# Patient Record
Sex: MALE | Race: BLACK OR AFRICAN AMERICAN | NOT HISPANIC OR LATINO | Employment: FULL TIME | URBAN - METROPOLITAN AREA
[De-identification: names, ages, dates, MRNs, and addresses within clinical notes are randomized per-mention and may not be internally consistent; named-entity substitution may affect disease eponyms.]

---

## 2023-03-25 PROBLEM — S06.5XAA SUBDURAL HEMATOMA (MULTI): Status: ACTIVE | Noted: 2023-03-25

## 2023-03-25 PROBLEM — I42.8: Status: ACTIVE | Noted: 2023-03-25

## 2023-03-25 PROBLEM — S32.009A FRACTURE LUMBAR VERTEBRA-CLOSED (MULTI): Status: ACTIVE | Noted: 2023-03-25

## 2023-03-25 PROBLEM — E11.21 DIABETIC NEPHROPATHY (MULTI): Status: ACTIVE | Noted: 2023-03-25

## 2023-03-25 PROBLEM — E10.641: Status: ACTIVE | Noted: 2023-03-25

## 2023-03-25 PROBLEM — M79.606 LOWER EXTREMITY PAIN: Status: ACTIVE | Noted: 2023-03-25

## 2023-03-25 PROBLEM — I10 BENIGN ESSENTIAL HTN: Status: ACTIVE | Noted: 2023-03-25

## 2023-03-25 PROBLEM — Z86.2 HISTORY OF ANEMIA: Status: ACTIVE | Noted: 2023-03-25

## 2023-03-25 PROBLEM — E55.9 VITAMIN D DEFICIENCY: Status: ACTIVE | Noted: 2023-03-25

## 2023-03-25 PROBLEM — R63.4 WEIGHT LOSS, UNINTENTIONAL: Status: ACTIVE | Noted: 2023-03-25

## 2023-03-25 PROBLEM — G89.4 CHRONIC PAIN SYNDROME: Status: ACTIVE | Noted: 2023-03-25

## 2023-03-25 PROBLEM — M54.50 LOW BACK PAIN: Status: ACTIVE | Noted: 2023-03-25

## 2023-03-25 PROBLEM — R25.2 LEG CRAMP: Status: ACTIVE | Noted: 2023-03-25

## 2023-03-25 PROBLEM — R29.818 SUSPECTED SLEEP APNEA: Status: ACTIVE | Noted: 2023-03-25

## 2023-03-25 PROBLEM — H53.8 BLURRY VISION, RIGHT EYE: Status: ACTIVE | Noted: 2023-03-25

## 2023-03-25 PROBLEM — N18.9 CKD (CHRONIC KIDNEY DISEASE): Status: ACTIVE | Noted: 2023-03-25

## 2023-03-25 PROBLEM — E10.9 TYPE 1 DIABETES (MULTI): Status: ACTIVE | Noted: 2023-03-25

## 2023-03-25 PROBLEM — E78.5 HYPERLIPEMIA: Status: ACTIVE | Noted: 2023-03-25

## 2023-03-25 PROBLEM — G47.00 INSOMNIA: Status: ACTIVE | Noted: 2023-03-25

## 2023-03-25 PROBLEM — M47.816 LUMBAR SPONDYLOSIS: Status: ACTIVE | Noted: 2023-03-25

## 2023-03-25 PROBLEM — K59.00 CONSTIPATION: Status: ACTIVE | Noted: 2023-03-25

## 2023-03-25 PROBLEM — S32.030A COMPRESSION FRACTURE OF THIRD LUMBAR VERTEBRA (MULTI): Status: ACTIVE | Noted: 2023-03-25

## 2023-03-25 PROBLEM — E16.2 HYPOGLYCEMIA: Status: ACTIVE | Noted: 2023-03-25

## 2023-03-25 PROBLEM — J30.2 SEASONAL ALLERGIC RHINITIS: Status: ACTIVE | Noted: 2023-03-25

## 2023-03-25 PROBLEM — E10.65 UNCONTROLLED TYPE 1 DIABETES MELLITUS WITH HYPERGLYCEMIA (MULTI): Status: ACTIVE | Noted: 2023-03-25

## 2023-03-25 PROBLEM — Z96.41 INSULIN PUMP STATUS: Status: ACTIVE | Noted: 2023-03-25

## 2023-03-25 PROBLEM — R07.89 ATYPICAL CHEST PAIN: Status: ACTIVE | Noted: 2023-03-25

## 2023-03-25 PROBLEM — N52.9 MALE ERECTILE DISORDER: Status: ACTIVE | Noted: 2023-03-25

## 2023-03-25 RX ORDER — POLYETHYLENE GLYCOL 3350 17 G/17G
POWDER, FOR SOLUTION ORAL
COMMUNITY
Start: 2022-06-24

## 2023-03-25 RX ORDER — LISINOPRIL 40 MG/1
TABLET ORAL
COMMUNITY
Start: 2020-03-23 | End: 2024-01-04 | Stop reason: SDUPTHER

## 2023-03-25 RX ORDER — ERGOCALCIFEROL 1.25 MG/1
1 CAPSULE ORAL
COMMUNITY
Start: 2022-01-19 | End: 2024-01-18 | Stop reason: SDUPTHER

## 2023-03-25 RX ORDER — CHLORTHALIDONE 50 MG/1
1 TABLET ORAL DAILY
COMMUNITY
Start: 2020-01-14 | End: 2023-06-05 | Stop reason: SDUPTHER

## 2023-03-25 RX ORDER — INSULIN LISPRO 100 [IU]/ML
INJECTION, SOLUTION INTRAVENOUS; SUBCUTANEOUS
COMMUNITY
End: 2024-02-26 | Stop reason: SDUPTHER

## 2023-03-25 RX ORDER — PEN NEEDLE, DIABETIC 30 GX3/16"
NEEDLE, DISPOSABLE MISCELLANEOUS
COMMUNITY

## 2023-03-25 RX ORDER — OXYCODONE AND ACETAMINOPHEN 5; 325 MG/1; MG/1
TABLET ORAL
COMMUNITY
Start: 2022-11-27 | End: 2023-03-28 | Stop reason: SDUPTHER

## 2023-03-25 RX ORDER — BLOOD SUGAR DIAGNOSTIC
STRIP MISCELLANEOUS
COMMUNITY
Start: 2021-05-04 | End: 2024-02-08 | Stop reason: SDUPTHER

## 2023-03-25 RX ORDER — FLUTICASONE PROPIONATE 50 MCG
1 SPRAY, SUSPENSION (ML) NASAL 2 TIMES DAILY
COMMUNITY
Start: 2022-06-24

## 2023-03-25 RX ORDER — HYDRALAZINE HYDROCHLORIDE 25 MG/1
1 TABLET, FILM COATED ORAL 3 TIMES DAILY
COMMUNITY
Start: 2020-09-08 | End: 2023-06-05 | Stop reason: SDUPTHER

## 2023-03-25 RX ORDER — LIDOCAINE 50 MG/G
PATCH TOPICAL
COMMUNITY
Start: 2021-02-11 | End: 2024-03-07 | Stop reason: SDUPTHER

## 2023-03-25 RX ORDER — SILDENAFIL 25 MG/1
TABLET, FILM COATED ORAL
COMMUNITY
Start: 2020-09-29

## 2023-03-25 RX ORDER — HYDROXYZINE HYDROCHLORIDE 25 MG/1
25 TABLET, FILM COATED ORAL EVERY 4 HOURS PRN
COMMUNITY
Start: 2022-06-24

## 2023-03-25 RX ORDER — ACETAMINOPHEN 500 MG
TABLET ORAL
COMMUNITY
Start: 2020-07-18

## 2023-03-25 RX ORDER — AMLODIPINE BESYLATE 10 MG/1
1 TABLET ORAL DAILY
COMMUNITY
Start: 2018-12-22 | End: 2023-06-05 | Stop reason: SDUPTHER

## 2023-03-25 RX ORDER — CETIRIZINE HYDROCHLORIDE 10 MG/1
TABLET ORAL
COMMUNITY
Start: 2022-06-24 | End: 2023-06-29 | Stop reason: SDUPTHER

## 2023-03-25 RX ORDER — FERROUS GLUCONATE 324(38)MG
1 TABLET ORAL EVERY OTHER DAY
COMMUNITY
Start: 2022-06-24 | End: 2024-01-18 | Stop reason: ALTCHOICE

## 2023-03-25 RX ORDER — NALOXONE HYDROCHLORIDE 4 MG/.1ML
SPRAY NASAL
COMMUNITY
Start: 2020-12-03 | End: 2024-03-08 | Stop reason: SDUPTHER

## 2023-03-25 RX ORDER — ATORVASTATIN CALCIUM 40 MG/1
1 TABLET, FILM COATED ORAL NIGHTLY
COMMUNITY
Start: 2020-09-29 | End: 2023-06-05 | Stop reason: SDUPTHER

## 2023-03-28 ENCOUNTER — OFFICE VISIT (OUTPATIENT)
Dept: PRIMARY CARE | Facility: CLINIC | Age: 43
End: 2023-03-28
Payer: COMMERCIAL

## 2023-03-28 VITALS
BODY MASS INDEX: 27.82 KG/M2 | DIASTOLIC BLOOD PRESSURE: 83 MMHG | WEIGHT: 193.9 LBS | TEMPERATURE: 99.1 F | OXYGEN SATURATION: 97 % | SYSTOLIC BLOOD PRESSURE: 141 MMHG | HEART RATE: 70 BPM

## 2023-03-28 DIAGNOSIS — S32.030S COMPRESSION FRACTURE OF L3 VERTEBRA, SEQUELA: Primary | ICD-10-CM

## 2023-03-28 DIAGNOSIS — S32.009S CLOSED FRACTURE OF LUMBAR VERTEBRA, UNSPECIFIED FRACTURE MORPHOLOGY, UNSPECIFIED LUMBAR VERTEBRAL LEVEL, SEQUELA: ICD-10-CM

## 2023-03-28 PROCEDURE — 4010F ACE/ARB THERAPY RXD/TAKEN: CPT | Performed by: STUDENT IN AN ORGANIZED HEALTH CARE EDUCATION/TRAINING PROGRAM

## 2023-03-28 PROCEDURE — 99214 OFFICE O/P EST MOD 30 MIN: CPT | Performed by: STUDENT IN AN ORGANIZED HEALTH CARE EDUCATION/TRAINING PROGRAM

## 2023-03-28 PROCEDURE — 1036F TOBACCO NON-USER: CPT | Performed by: STUDENT IN AN ORGANIZED HEALTH CARE EDUCATION/TRAINING PROGRAM

## 2023-03-28 PROCEDURE — 3077F SYST BP >= 140 MM HG: CPT | Performed by: STUDENT IN AN ORGANIZED HEALTH CARE EDUCATION/TRAINING PROGRAM

## 2023-03-28 PROCEDURE — 3079F DIAST BP 80-89 MM HG: CPT | Performed by: STUDENT IN AN ORGANIZED HEALTH CARE EDUCATION/TRAINING PROGRAM

## 2023-03-28 RX ORDER — OXYCODONE AND ACETAMINOPHEN 5; 325 MG/1; MG/1
1 TABLET ORAL 2 TIMES DAILY PRN
Qty: 60 TABLET | Refills: 0 | Status: SHIPPED | OUTPATIENT
Start: 2023-03-28 | End: 2023-04-27 | Stop reason: SDUPTHER
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
## 2023-03-29 ENCOUNTER — APPOINTMENT (OUTPATIENT)
Dept: PRIMARY CARE | Facility: CLINIC | Age: 43
End: 2023-03-29

## 2023-04-09 NOTE — PROGRESS NOTES
I saw and evaluated the patient. I personally obtained the key and critical portions of the history and physical exam or was physically present for key and critical portions performed by the resident/fellow. I reviewed the resident/fellow's documentation and discussed the patient with the resident/fellow. I agree with the resident/fellow's medical decision making as documented in the note.    Ingrid Gonzalez MD

## 2023-04-27 DIAGNOSIS — S32.030S COMPRESSION FRACTURE OF L3 VERTEBRA, SEQUELA: ICD-10-CM

## 2023-04-27 RX ORDER — OXYCODONE AND ACETAMINOPHEN 5; 325 MG/1; MG/1
1 TABLET ORAL 2 TIMES DAILY PRN
Qty: 60 TABLET | Refills: 0 | Status: SHIPPED | OUTPATIENT
Start: 2023-04-27 | End: 2023-06-29 | Stop reason: SDUPTHER

## 2023-05-31 ENCOUNTER — TELEPHONE (OUTPATIENT)
Dept: PRIMARY CARE | Facility: CLINIC | Age: 43
End: 2023-05-31

## 2023-05-31 NOTE — TELEPHONE ENCOUNTER
Patient called and requested for his medication to be refilled, I called to reach out to the patient about what medication needs to be refilled but no answer.

## 2023-06-05 DIAGNOSIS — E78.2 MODERATE MIXED HYPERLIPIDEMIA NOT REQUIRING STATIN THERAPY: ICD-10-CM

## 2023-06-05 DIAGNOSIS — I10 BENIGN ESSENTIAL HTN: Primary | ICD-10-CM

## 2023-06-05 RX ORDER — AMLODIPINE BESYLATE 10 MG/1
10 TABLET ORAL DAILY
Qty: 90 TABLET | Refills: 3 | Status: SHIPPED | OUTPATIENT
Start: 2023-06-05 | End: 2024-03-07 | Stop reason: SDUPTHER

## 2023-06-05 RX ORDER — CHLORTHALIDONE 50 MG/1
50 TABLET ORAL DAILY
Qty: 90 TABLET | Refills: 3 | Status: SHIPPED | OUTPATIENT
Start: 2023-06-05 | End: 2024-03-07 | Stop reason: SDUPTHER

## 2023-06-05 RX ORDER — HYDRALAZINE HYDROCHLORIDE 25 MG/1
25 TABLET, FILM COATED ORAL 3 TIMES DAILY
Qty: 90 TABLET | Refills: 3 | Status: SHIPPED | OUTPATIENT
Start: 2023-06-05 | End: 2024-03-07 | Stop reason: SDUPTHER

## 2023-06-05 RX ORDER — ATORVASTATIN CALCIUM 40 MG/1
40 TABLET, FILM COATED ORAL NIGHTLY
Qty: 90 TABLET | Refills: 3 | Status: SHIPPED | OUTPATIENT
Start: 2023-06-05 | End: 2024-03-07 | Stop reason: SDUPTHER

## 2023-06-08 ENCOUNTER — TELEPHONE (OUTPATIENT)
Dept: PRIMARY CARE | Facility: CLINIC | Age: 43
End: 2023-06-08

## 2023-06-09 ENCOUNTER — TELEPHONE (OUTPATIENT)
Dept: PRIMARY CARE | Facility: CLINIC | Age: 43
End: 2023-06-09

## 2023-06-14 ENCOUNTER — TELEPHONE (OUTPATIENT)
Dept: PRIMARY CARE | Facility: CLINIC | Age: 43
End: 2023-06-14

## 2023-06-14 NOTE — TELEPHONE ENCOUNTER
PT came in asking for a refill of his medication for opioids.  Had an appointment today but was late and has to reschedule, was wondering if he could still get his pain med's refilled.

## 2023-06-29 ENCOUNTER — OFFICE VISIT (OUTPATIENT)
Dept: PRIMARY CARE | Facility: CLINIC | Age: 43
End: 2023-06-29
Payer: COMMERCIAL

## 2023-06-29 VITALS
DIASTOLIC BLOOD PRESSURE: 80 MMHG | OXYGEN SATURATION: 100 % | BODY MASS INDEX: 26.36 KG/M2 | HEIGHT: 70 IN | SYSTOLIC BLOOD PRESSURE: 128 MMHG | RESPIRATION RATE: 18 BRPM | WEIGHT: 184.1 LBS | TEMPERATURE: 96.1 F | HEART RATE: 73 BPM

## 2023-06-29 DIAGNOSIS — T78.40XD ALLERGY, SUBSEQUENT ENCOUNTER: Primary | ICD-10-CM

## 2023-06-29 DIAGNOSIS — Z79.899 CONTROLLED SUBSTANCE AGREEMENT SIGNED: ICD-10-CM

## 2023-06-29 DIAGNOSIS — S32.030S COMPRESSION FRACTURE OF L3 VERTEBRA, SEQUELA: ICD-10-CM

## 2023-06-29 DIAGNOSIS — G89.21 CHRONIC PAIN AFTER TRAUMATIC INJURY: ICD-10-CM

## 2023-06-29 PROCEDURE — 99214 OFFICE O/P EST MOD 30 MIN: CPT

## 2023-06-29 PROCEDURE — 3079F DIAST BP 80-89 MM HG: CPT

## 2023-06-29 PROCEDURE — 3074F SYST BP LT 130 MM HG: CPT

## 2023-06-29 PROCEDURE — 4010F ACE/ARB THERAPY RXD/TAKEN: CPT

## 2023-06-29 PROCEDURE — 1036F TOBACCO NON-USER: CPT

## 2023-06-29 RX ORDER — OXYCODONE AND ACETAMINOPHEN 5; 325 MG/1; MG/1
1 TABLET ORAL 2 TIMES DAILY PRN
Qty: 60 TABLET | Refills: 0 | Status: SHIPPED | OUTPATIENT
Start: 2023-06-29 | End: 2023-08-22 | Stop reason: SDUPTHER

## 2023-06-29 RX ORDER — OXYCODONE AND ACETAMINOPHEN 5; 325 MG/1; MG/1
1 TABLET ORAL 2 TIMES DAILY PRN
Qty: 60 TABLET | Refills: 0 | Status: SHIPPED | OUTPATIENT
Start: 2023-06-29 | End: 2023-06-29 | Stop reason: SDUPTHER

## 2023-06-29 RX ORDER — CETIRIZINE HYDROCHLORIDE 10 MG/1
10 TABLET ORAL DAILY
Qty: 90 TABLET | Refills: 3 | Status: SHIPPED | OUTPATIENT
Start: 2023-06-29

## 2023-06-29 ASSESSMENT — PAIN SCALES - GENERAL: PAINLEVEL: 8
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
## 2023-06-29 NOTE — PROGRESS NOTES
Subjective:  The patient presents to the clinic today for medication refill. He reported that he missed his last appointment and has run out of his Percocet 2-3 weeks ago. He reported that he works as an  at [employer] which is a very physical job. He reported that he is able to complete his ADLs and tasks at his job due to the medication making his pain more manageable. He reported he keeps his medications in a lock box at home so only he has access to them.     He also requested a refill on Zyrtec.    OARRS:  Cosme Hightower MD on 6/29/2023  5:47 PM  I have personally reviewed the OARRS report for Good Gee. I have considered the risks of abuse, dependence, addiction and diversion    Is the patient prescribed a combination of a benzodiazepine and opioid?  No    Last Urine Drug Screen / ordered today: Yes  Recent Results (from the past 79285 hour(s))   Opiate Confirmation, Urine    Collection Time: 06/24/22  5:50 PM   Result Value Ref Range    6-Acetylmorphine <25 Cutoff <25 ng/mL    Codeine <50 Cutoff <50 ng/mL    Hydrocodone <25 Cutoff <25 ng/mL    Hydromorphone <25 Cutoff <25 ng/mL    Morphine Urine <50 Cutoff <50 ng/mL    Norhydrocodone <25 Cutoff <25 ng/mL    Noroxycodone 447 (A) Cutoff <25 ng/mL    Oxycodone 77 (A) Cutoff <25 ng/mL    Oxymorphone 202 (A) Cutoff <25 ng/mL   Drug Screen, Urine With Reflex to Confirmation    Collection Time: 06/24/22  5:50 PM   Result Value Ref Range    DRUG SCREEN COMMENT URINE SEE BELOW     Amphetamine Screen, Urine PRESUMPTIVE NEGATIVE NEGATIVE    Barbiturate Screen, Urine PRESUMPTIVE NEGATIVE NEGATIVE    BENZODIAZEPINE (PRESENCE) IN URINE BY SCREEN METHOD PRESUMPTIVE NEGATIVE NEGATIVE    Cannabinoid Screen, Urine PRESUMPTIVE NEGATIVE NEGATIVE    Cocaine Screen, Urine PRESUMPTIVE NEGATIVE NEGATIVE    Fentanyl, Ur PRESUMPTIVE NEGATIVE NEGATIVE    Methadone Screen, Urine PRESUMPTIVE NEGATIVE NEGATIVE    Opiate Screen, Urine PRESUMPTIVE NEGATIVE NEGATIVE     "Oxycodone Screen, Ur PRESUMPTIVE POSITIVE (A) NEGATIVE    PCP Screen, Urine PRESUMPTIVE NEGATIVE NEGATIVE     N/A    Controlled Substance Agreement:  Date of the Last Agreement: 3/28/2023  Reviewed Controlled Substance Agreement including but not limited to the benefits, risks, and alternatives to treatment with a Controlled Substance medication(s).    Opioids:  What is the patient's goal of therapy? To reduce pain to make it more tolerable and be able to complete his ADLs and job tasks  Is this being achieved with current treatment? Yes, he reported his pain is 10-11/10 but is more manageable 4/10 on Percocet    Side Effects:  The patient denies any drowsiness, constipation or any other side effects    I feel that it is clinically indicated to continue this current medication regimen after consideration of alternative therapies, and other non-opioid treatment.    Opioid Risk Screening:  No data recorded    Pain Assessment:  No data recorded      Review of Systems   Constitutional:  Negative for fever.   Respiratory:  Negative for chest tightness and shortness of breath.    Cardiovascular:  Negative for chest pain.   Gastrointestinal:  Negative for abdominal distention, constipation, nausea and vomiting.   Psychiatric/Behavioral:  Negative for agitation and behavioral problems.        Objective   /80 (BP Location: Right arm, Patient Position: Sitting, BP Cuff Size: Adult)   Pulse 73   Temp 35.6 °C (96.1 °F) (Temporal)   Resp 18   Ht 1.778 m (5' 10\")   Wt 83.5 kg (184 lb 1.6 oz)   SpO2 100%   BMI 26.42 kg/m²    Physical Exam  Constitutional:       General: He is not in acute distress.     Appearance: Normal appearance.   Eyes:      Extraocular Movements: Extraocular movements intact.   Cardiovascular:      Rate and Rhythm: Normal rate and regular rhythm.      Heart sounds: Normal heart sounds. No murmur heard.  Pulmonary:      Effort: Pulmonary effort is normal. No respiratory distress.      Breath " sounds: Normal breath sounds.   Abdominal:      General: There is no distension.      Palpations: Abdomen is soft.      Tenderness: There is no abdominal tenderness.   Musculoskeletal:         General: Tenderness present. Normal range of motion.      Comments: Tenderness to palpation of lumbar spine   Neurological:      General: No focal deficit present.      Mental Status: He is alert.   Psychiatric:         Mood and Affect: Mood normal.         Behavior: Behavior normal.       Assessment/Plan     Good Gee is a 43 year old male who presents to the clinic for medication refills.    #Chronic lower back pain  - OARRS reviewed  - Refilled Percocet for 1 month supply  - Last UDS 1 year ago, ordered new UDS to be done at next visit as patient has not taken his meds in ~3 weeks    #Med refill  - Refilled Zyrtec    Problem List Items Addressed This Visit          Neuro    Compression fracture of third lumbar vertebra (CMS/HCC)    Relevant Medications    oxyCODONE-acetaminophen (Percocet) 5-325 mg tablet     Other Visit Diagnoses       Allergy, subsequent encounter    -  Primary    Relevant Medications    cetirizine (ZyrTEC) 10 mg tablet    Controlled substance agreement signed        Relevant Orders    Opiate/Opioid/Benzo Extended Prescription Compliance              RTC in 1 month for follow up visit and UDS.    The patient was discussed with Dr. Gonzalez.    Cosme Hightower MD  Family Medicine   PGY-2

## 2023-06-30 ASSESSMENT — ENCOUNTER SYMPTOMS
NAUSEA: 0
VOMITING: 0
CONSTIPATION: 0
FEVER: 0
AGITATION: 0
ABDOMINAL DISTENTION: 0
CHEST TIGHTNESS: 0
SHORTNESS OF BREATH: 0

## 2023-08-22 ENCOUNTER — APPOINTMENT (OUTPATIENT)
Dept: PRIMARY CARE | Facility: CLINIC | Age: 43
End: 2023-08-22

## 2023-08-22 ENCOUNTER — OFFICE VISIT (OUTPATIENT)
Dept: PRIMARY CARE | Facility: CLINIC | Age: 43
End: 2023-08-22

## 2023-08-22 VITALS
SYSTOLIC BLOOD PRESSURE: 139 MMHG | DIASTOLIC BLOOD PRESSURE: 88 MMHG | BODY MASS INDEX: 25.45 KG/M2 | RESPIRATION RATE: 18 BRPM | TEMPERATURE: 96.3 F | HEART RATE: 64 BPM | OXYGEN SATURATION: 99 % | HEIGHT: 71 IN | WEIGHT: 181.8 LBS

## 2023-08-22 DIAGNOSIS — Z79.899 CONTROLLED SUBSTANCE AGREEMENT SIGNED: ICD-10-CM

## 2023-08-22 DIAGNOSIS — S32.030S COMPRESSION FRACTURE OF L3 VERTEBRA, SEQUELA: ICD-10-CM

## 2023-08-22 DIAGNOSIS — G89.4 CHRONIC PAIN DISORDER: Primary | ICD-10-CM

## 2023-08-22 PROCEDURE — 4010F ACE/ARB THERAPY RXD/TAKEN: CPT | Performed by: STUDENT IN AN ORGANIZED HEALTH CARE EDUCATION/TRAINING PROGRAM

## 2023-08-22 PROCEDURE — 99214 OFFICE O/P EST MOD 30 MIN: CPT | Performed by: STUDENT IN AN ORGANIZED HEALTH CARE EDUCATION/TRAINING PROGRAM

## 2023-08-22 PROCEDURE — 1036F TOBACCO NON-USER: CPT | Performed by: STUDENT IN AN ORGANIZED HEALTH CARE EDUCATION/TRAINING PROGRAM

## 2023-08-22 PROCEDURE — 3079F DIAST BP 80-89 MM HG: CPT | Performed by: STUDENT IN AN ORGANIZED HEALTH CARE EDUCATION/TRAINING PROGRAM

## 2023-08-22 PROCEDURE — 3075F SYST BP GE 130 - 139MM HG: CPT | Performed by: STUDENT IN AN ORGANIZED HEALTH CARE EDUCATION/TRAINING PROGRAM

## 2023-08-22 RX ORDER — OXYCODONE AND ACETAMINOPHEN 5; 325 MG/1; MG/1
1 TABLET ORAL 2 TIMES DAILY PRN
Qty: 50 TABLET | Refills: 0 | Status: SHIPPED | OUTPATIENT
Start: 2023-08-22 | End: 2023-08-22

## 2023-08-22 ASSESSMENT — PAIN SCALES - GENERAL: PAINLEVEL: 8
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
## 2023-08-23 NOTE — PROGRESS NOTES
I saw and evaluated the patient. I personally obtained the key and critical portions of the history and physical exam or was physically present for key and critical portions performed by the resident/fellow. I reviewed the resident/fellow's documentation and discussed the patient with the resident/fellow. I agree with the resident/fellow's medical decision making as documented in the note with the exception/addition of the following:  Here for follow up and chronic opiate refills for back pain: OARRS reviewed , refills given  Discussed pain management follow up.  Rolanda Thomas MD

## 2023-09-25 ENCOUNTER — APPOINTMENT (OUTPATIENT)
Dept: PRIMARY CARE | Facility: CLINIC | Age: 43
End: 2023-09-25
Payer: COMMERCIAL

## 2023-11-11 DIAGNOSIS — S32.030S COMPRESSION FRACTURE OF L3 VERTEBRA, SEQUELA: ICD-10-CM

## 2023-11-14 ENCOUNTER — PHARMACY VISIT (OUTPATIENT)
Dept: PHARMACY | Facility: CLINIC | Age: 43
End: 2023-11-14
Payer: MEDICAID

## 2023-11-14 ENCOUNTER — TELEPHONE (OUTPATIENT)
Dept: ENDOCRINOLOGY | Facility: CLINIC | Age: 43
End: 2023-11-14
Payer: MEDICAID

## 2023-11-14 PROCEDURE — RXMED WILLOW AMBULATORY MEDICATION CHARGE

## 2023-11-14 RX ORDER — OXYCODONE AND ACETAMINOPHEN 5; 325 MG/1; MG/1
TABLET ORAL
Qty: 50 TABLET | Refills: 0 | Status: SHIPPED | OUTPATIENT
Start: 2023-11-14 | End: 2024-01-04 | Stop reason: SDUPTHER

## 2023-11-14 NOTE — TELEPHONE ENCOUNTER
Good left a voice mail stating that he got a new insulin pump and  Wanted to talk to you about walking him thru the settings.  He said he  Doesn't know how to do that.  His number is [phone].

## 2024-01-04 ENCOUNTER — OFFICE VISIT (OUTPATIENT)
Dept: PRIMARY CARE | Facility: CLINIC | Age: 44
End: 2024-01-04
Payer: COMMERCIAL

## 2024-01-04 ENCOUNTER — PHARMACY VISIT (OUTPATIENT)
Dept: PHARMACY | Facility: CLINIC | Age: 44
End: 2024-01-04
Payer: MEDICAID

## 2024-01-04 VITALS
DIASTOLIC BLOOD PRESSURE: 94 MMHG | SYSTOLIC BLOOD PRESSURE: 150 MMHG | BODY MASS INDEX: 26.74 KG/M2 | WEIGHT: 191 LBS | TEMPERATURE: 97 F | HEIGHT: 71 IN | OXYGEN SATURATION: 100 % | HEART RATE: 71 BPM

## 2024-01-04 DIAGNOSIS — S32.030S COMPRESSION FRACTURE OF L3 VERTEBRA, SEQUELA: Primary | ICD-10-CM

## 2024-01-04 DIAGNOSIS — I10 PRIMARY HYPERTENSION: ICD-10-CM

## 2024-01-04 PROCEDURE — RXMED WILLOW AMBULATORY MEDICATION CHARGE

## 2024-01-04 PROCEDURE — 3077F SYST BP >= 140 MM HG: CPT

## 2024-01-04 PROCEDURE — 99214 OFFICE O/P EST MOD 30 MIN: CPT

## 2024-01-04 PROCEDURE — 4010F ACE/ARB THERAPY RXD/TAKEN: CPT

## 2024-01-04 PROCEDURE — 1036F TOBACCO NON-USER: CPT

## 2024-01-04 PROCEDURE — 3080F DIAST BP >= 90 MM HG: CPT

## 2024-01-04 RX ORDER — OXYCODONE AND ACETAMINOPHEN 5; 325 MG/1; MG/1
TABLET ORAL
Qty: 50 TABLET | Refills: 0 | Status: SHIPPED | OUTPATIENT
Start: 2024-01-04 | End: 2024-02-07 | Stop reason: SDUPTHER

## 2024-01-04 RX ORDER — LISINOPRIL 40 MG/1
40 TABLET ORAL DAILY
Qty: 90 TABLET | Refills: 3 | Status: SHIPPED | OUTPATIENT
Start: 2024-01-04

## 2024-01-04 ASSESSMENT — ENCOUNTER SYMPTOMS
SHORTNESS OF BREATH: 0
ABDOMINAL PAIN: 0

## 2024-01-04 ASSESSMENT — PAIN SCALES - GENERAL: PAINLEVEL: 7

## 2024-01-04 NOTE — PROGRESS NOTES
I saw and evaluated the patient. I personally obtained the key and critical portions of the history and physical exam or was physically present for key and critical portions performed by the resident/fellow. I reviewed the resident/fellow's documentation and discussed the patient with the resident/fellow. I agree with the resident/fellow's medical decision making as documented in the note.    Coy Ventura MD

## 2024-01-04 NOTE — PROGRESS NOTES
Subjective   Patient ID:   Good Gee is a 43 y.o. male who presents for Med Refill (Pt needs refill on pain medication and other medicines. ).  HPI  #HTN  - Checks BP everyday  - Average readings in 130/80s while on medications and 140-160s/80s while off Lisinopril  - Requested refill on Lisinopril 40 mg every day  - Reported he has been out of it for over a month  - Reports taking amlodipine, hydral and chlorthalidone regularly    #Chronic back pain  - Has had difficulty making appointments  - Was last seen in clinic in 8/2023  - Reported he took Tylenol, Ibuprofen, and Lidocaine patches for his pain since he ran out of his percocet and rated the pain a 9/10  - Works at [employer] and reported he is not able to lift boxes     OARRS:  No data recorded  I have personally reviewed the OARRS report for Good Gee. I have considered the risks of abuse, dependence, addiction and diversion    Is the patient prescribed a combination of a benzodiazepine and opioid?  No    Last Urine Drug Screen / ordered today: No  No results found for this or any previous visit (from the past 8760 hour(s)).  Results are as expected.         Controlled Substance Agreement:  Date of the Last Agreement: 3/28/2023  Reviewed Controlled Substance Agreement including but not limited to the benefits, risks, and alternatives to treatment with a Controlled Substance medication(s).    Opioids:  What is the patient's goal of therapy? To reduce pain and make daily tasks more tolerable  Is this being achieved with current treatment? Yes, pain is well controlled on current medication    Side Effects:  The patient denies any drowsiness, constipation or any other side effects    I have calculated the patient's Morphine Dose Equivalent (MED):   I have considered referral to Pain Management and/or a specialist, and do not feel it is necessary at this time.    I feel that it is clinically indicated to continue this current medication regimen after  "consideration of alternative therapies, and other non-opioid treatment.    Opioid Risk Screening:  No data recorded    Pain Assessment:  No data recorded      Review of Systems   Respiratory:  Negative for shortness of breath.    Cardiovascular:  Negative for chest pain.   Gastrointestinal:  Negative for abdominal pain.       Objective   BP (!) 150/94 (BP Location: Left arm, Patient Position: Sitting)   Pulse 71   Temp 36.1 °C (97 °F) (Temporal)   Ht 1.803 m (5' 11\")   Wt 86.6 kg (191 lb)   SpO2 100%   BMI 26.64 kg/m²    Physical Exam  Constitutional:       General: He is not in acute distress.     Appearance: Normal appearance. He is not ill-appearing.   HENT:      Head: Normocephalic and atraumatic.   Eyes:      Extraocular Movements: Extraocular movements intact.   Cardiovascular:      Rate and Rhythm: Normal rate and regular rhythm.      Heart sounds: Normal heart sounds. No murmur heard.     No friction rub. No gallop.   Pulmonary:      Effort: Pulmonary effort is normal. No respiratory distress.      Breath sounds: Normal breath sounds. No stridor. No wheezing, rhonchi or rales.   Abdominal:      General: There is no distension.      Palpations: Abdomen is soft.      Tenderness: There is no abdominal tenderness. There is no guarding.   Musculoskeletal:         General: Tenderness present. Normal range of motion.      Cervical back: Normal range of motion.      Right lower leg: No edema.      Left lower leg: No edema.      Comments: Tenderness to palpation of midline thoracic and lumber spine   Skin:     General: Skin is warm and dry.   Neurological:      General: No focal deficit present.      Mental Status: He is alert.   Psychiatric:         Mood and Affect: Mood normal.         Behavior: Behavior normal.         Assessment/Plan     Problem List Items Addressed This Visit    None  Good Gee is a 43 year old male who presents to the clinic for medication refill.    #HTN  - Encouraged to continue " checking BP regularly  - Refilled Lisinopril 40 mg QD   - c/w Hydralazine 25 mg TID, Amlodipine 10 mg every day, & Chlorthalidone 50 mg every day    #Chronic back pain  - Refilled Percocet 5-325 mg BID PRN for pain  - Encouraged to make an appointment prior to 3/28 to renew controlled substance agreement and updated UDS  - Goal is to wean patient off Percocet in the future, Refilled 50 tablets instead of 60 tablets  - c/w Tylenol, Ibuprofen, & Lidocaine patches PRN      RTC in 3 months for follow up.    The patient was discussed with Dr. Ventura.    Cosme Hightower MD  Family Medicine   PGY-2

## 2024-01-18 ENCOUNTER — LAB (OUTPATIENT)
Dept: LAB | Facility: LAB | Age: 44
End: 2024-01-18
Payer: COMMERCIAL

## 2024-01-18 ENCOUNTER — OFFICE VISIT (OUTPATIENT)
Dept: ENDOCRINOLOGY | Facility: CLINIC | Age: 44
End: 2024-01-18
Payer: COMMERCIAL

## 2024-01-18 VITALS
HEIGHT: 70 IN | BODY MASS INDEX: 28.49 KG/M2 | SYSTOLIC BLOOD PRESSURE: 152 MMHG | DIASTOLIC BLOOD PRESSURE: 88 MMHG | WEIGHT: 199 LBS

## 2024-01-18 DIAGNOSIS — Z97.8 USES SELF-APPLIED CONTINUOUS GLUCOSE MONITORING DEVICE: ICD-10-CM

## 2024-01-18 DIAGNOSIS — E55.9 VITAMIN D DEFICIENCY: ICD-10-CM

## 2024-01-18 DIAGNOSIS — E10.641: ICD-10-CM

## 2024-01-18 DIAGNOSIS — Z96.41 INSULIN PUMP STATUS: Primary | ICD-10-CM

## 2024-01-18 LAB
25(OH)D3 SERPL-MCNC: 31 NG/ML (ref 30–100)
ALBUMIN SERPL BCP-MCNC: 3.9 G/DL (ref 3.4–5)
ALP SERPL-CCNC: 66 U/L (ref 33–120)
ALT SERPL W P-5'-P-CCNC: 18 U/L (ref 10–52)
ANION GAP SERPL CALC-SCNC: 12 MMOL/L (ref 10–20)
AST SERPL W P-5'-P-CCNC: 20 U/L (ref 9–39)
BILIRUB SERPL-MCNC: 1 MG/DL (ref 0–1.2)
BUN SERPL-MCNC: 28 MG/DL (ref 6–23)
CALCIUM SERPL-MCNC: 8.8 MG/DL (ref 8.6–10.3)
CHLORIDE SERPL-SCNC: 107 MMOL/L (ref 98–107)
CO2 SERPL-SCNC: 26 MMOL/L (ref 21–32)
CREAT SERPL-MCNC: 2.42 MG/DL (ref 0.5–1.3)
EGFRCR SERPLBLD CKD-EPI 2021: 33 ML/MIN/1.73M*2
GLUCOSE SERPL-MCNC: 151 MG/DL (ref 74–99)
POC FINGERSTICK BLOOD GLUCOSE: 147 MG/DL (ref 70–100)
POC HEMOGLOBIN A1C: 8.9 % (ref 4.2–6.5)
POTASSIUM SERPL-SCNC: 4.5 MMOL/L (ref 3.5–5.3)
PROT SERPL-MCNC: 7.1 G/DL (ref 6.4–8.2)
SODIUM SERPL-SCNC: 140 MMOL/L (ref 136–145)
TSH SERPL-ACNC: 1.56 MIU/L (ref 0.44–3.98)

## 2024-01-18 PROCEDURE — 4010F ACE/ARB THERAPY RXD/TAKEN: CPT | Performed by: STUDENT IN AN ORGANIZED HEALTH CARE EDUCATION/TRAINING PROGRAM

## 2024-01-18 PROCEDURE — RXMED WILLOW AMBULATORY MEDICATION CHARGE

## 2024-01-18 PROCEDURE — 3077F SYST BP >= 140 MM HG: CPT | Performed by: STUDENT IN AN ORGANIZED HEALTH CARE EDUCATION/TRAINING PROGRAM

## 2024-01-18 PROCEDURE — 82962 GLUCOSE BLOOD TEST: CPT | Performed by: STUDENT IN AN ORGANIZED HEALTH CARE EDUCATION/TRAINING PROGRAM

## 2024-01-18 PROCEDURE — 36415 COLL VENOUS BLD VENIPUNCTURE: CPT

## 2024-01-18 PROCEDURE — 82306 VITAMIN D 25 HYDROXY: CPT

## 2024-01-18 PROCEDURE — 99214 OFFICE O/P EST MOD 30 MIN: CPT | Performed by: STUDENT IN AN ORGANIZED HEALTH CARE EDUCATION/TRAINING PROGRAM

## 2024-01-18 PROCEDURE — 84443 ASSAY THYROID STIM HORMONE: CPT

## 2024-01-18 PROCEDURE — 83036 HEMOGLOBIN GLYCOSYLATED A1C: CPT | Performed by: STUDENT IN AN ORGANIZED HEALTH CARE EDUCATION/TRAINING PROGRAM

## 2024-01-18 PROCEDURE — 80053 COMPREHEN METABOLIC PANEL: CPT

## 2024-01-18 PROCEDURE — 95251 CONT GLUC MNTR ANALYSIS I&R: CPT | Performed by: STUDENT IN AN ORGANIZED HEALTH CARE EDUCATION/TRAINING PROGRAM

## 2024-01-18 PROCEDURE — 3079F DIAST BP 80-89 MM HG: CPT | Performed by: STUDENT IN AN ORGANIZED HEALTH CARE EDUCATION/TRAINING PROGRAM

## 2024-01-18 PROCEDURE — 1036F TOBACCO NON-USER: CPT | Performed by: STUDENT IN AN ORGANIZED HEALTH CARE EDUCATION/TRAINING PROGRAM

## 2024-01-18 RX ORDER — ERGOCALCIFEROL 1.25 MG/1
1 CAPSULE ORAL
Qty: 12 CAPSULE | Refills: 2 | Status: SHIPPED | OUTPATIENT
Start: 2024-01-18 | End: 2025-01-17

## 2024-01-18 ASSESSMENT — ENCOUNTER SYMPTOMS: CONSTITUTIONAL NEGATIVE: 1

## 2024-01-18 NOTE — PROGRESS NOTES
"Subjective   Patient ID: Good Gee is a 43 y.o. male who presents for Diabetes (Dx dm1: age 25 /PCP:Katja /Podiatry: does not see one /Eye exam: scheduled soon per patient/Patient is has 770G Medtronic pump and cgm  /Patient is testing his sugars up to 6 times per day. /Due to fluctuating blood glucose, hypoglycemia and insulin pump management. /Last hga1c 4/27/23 7.5%/Needs labs done- also will need refill on vitamin D to  pharmacy in chart).  Lab Results   Component Value Date    HGBA1C 8.9 (A) 01/18/2024      HPI  The patient is a 43 yo male with PMH of DM1 diagnosed at age 25 , presents to Endocrinology for DM follow up.   He is now on closed loop Medtronic 770 G.  automode 45 % , manual 53%  Sensor 49 %   A1c is at 8.9 , was 7.5     pump downloads reviewed and will be scanned in EMR  total insulin dose 37 units   basal 46 % , bolus 54%       Review of Systems   Constitutional: Negative.        Objective   Physical Exam  Constitutional:       Appearance: Normal appearance.   Cardiovascular:      Rate and Rhythm: Normal rate and regular rhythm.   Pulmonary:      Effort: Pulmonary effort is normal.      Breath sounds: Normal breath sounds.   Neurological:      Mental Status: He is alert.      Visit Vitals  /88   Ht 1.778 m (5' 10\")   Wt 90.3 kg (199 lb)   BMI 28.55 kg/m²   Smoking Status Never   BSA 2.11 m²        Assessment/Plan        he patient is a 43 yo male with PMH of DM1 diagnosed at age 25 , presents to Endocrinology for DM follow up.   he is on Medtronic 770 G  - Uncontrolled DM1 with Hba1c of 8.9 ( due to sensor issues ,through insurance now back on it )  , was 7.5 % on Medtronic pump on automode   - complicated by hypoglycemia at work , no recent hypoglycemia episodes   -HTN ,DM nephropathy urine albumin 815 mg/L on Lisinopril 40, amlodipine managed by nephrology  - HLD LDL 94 on atorvastatin 40 mg   -DM retinopathy s/p eye injection of eyes and laser treatment. Following with ophthalmology "   - SDH previously on Dexamethasone, repeat 3/2021 Ct head showed improvement in SDH following with NSY.     Plan :  Upgrade to 780 pump , gaurdian 4  continue current basal    -4:30 0.85 u/hr      4:30- 9 am 0.75 u/hr     9 am - 00:00 0.85 u/hr   - carb ratio 00:00 1:10    13:00 1:9  - sensitivity to 38     Labs today  RTC in 3 months

## 2024-01-28 ENCOUNTER — PHARMACY VISIT (OUTPATIENT)
Dept: PHARMACY | Facility: CLINIC | Age: 44
End: 2024-01-28
Payer: MEDICAID

## 2024-02-07 DIAGNOSIS — S32.030S COMPRESSION FRACTURE OF L3 VERTEBRA, SEQUELA: ICD-10-CM

## 2024-02-08 DIAGNOSIS — E10.641: ICD-10-CM

## 2024-02-08 PROCEDURE — RXMED WILLOW AMBULATORY MEDICATION CHARGE

## 2024-02-08 RX ORDER — BLOOD SUGAR DIAGNOSTIC
STRIP MISCELLANEOUS
Qty: 500 STRIP | Refills: 3 | Status: SHIPPED | OUTPATIENT
Start: 2024-02-08

## 2024-02-08 RX ORDER — OXYCODONE AND ACETAMINOPHEN 5; 325 MG/1; MG/1
TABLET ORAL
Qty: 50 TABLET | Refills: 0 | Status: SHIPPED | OUTPATIENT
Start: 2024-02-08 | End: 2024-03-08 | Stop reason: SDUPTHER

## 2024-02-09 ENCOUNTER — PHARMACY VISIT (OUTPATIENT)
Dept: PHARMACY | Facility: CLINIC | Age: 44
End: 2024-02-09
Payer: MEDICAID

## 2024-02-26 ENCOUNTER — TELEPHONE (OUTPATIENT)
Dept: ENDOCRINOLOGY | Facility: CLINIC | Age: 44
End: 2024-02-26
Payer: COMMERCIAL

## 2024-02-26 DIAGNOSIS — E10.641: ICD-10-CM

## 2024-02-26 RX ORDER — BLOOD-GLUCOSE METER
5 EACH MISCELLANEOUS DAILY
Qty: 400 STRIP | Refills: 3 | Status: SHIPPED | OUTPATIENT
Start: 2024-02-26

## 2024-02-26 RX ORDER — DEXTROSE 4 G
1 TABLET,CHEWABLE ORAL AS NEEDED
Qty: 1 EACH | Refills: 0 | Status: SHIPPED | OUTPATIENT
Start: 2024-02-26

## 2024-02-26 RX ORDER — INSULIN LISPRO 100 [IU]/ML
60 INJECTION, SOLUTION INTRAVENOUS; SUBCUTANEOUS DAILY
Qty: 60 ML | Refills: 1 | Status: SHIPPED | OUTPATIENT
Start: 2024-02-26 | End: 2024-06-03

## 2024-02-26 RX ORDER — LANCETS 33 GAUGE
5 EACH MISCELLANEOUS
Qty: 400 EACH | Refills: 3 | Status: SHIPPED | OUTPATIENT
Start: 2024-02-26

## 2024-02-26 NOTE — TELEPHONE ENCOUNTER
1) Patient is in need of a refill of his Lispro Insulin for his pump.  2) Ins. Isn't covering Accuchek test strips, what is the next step? New Meter?    New Pharmacy is [facility] [address] [phone]

## 2024-03-07 ENCOUNTER — LAB (OUTPATIENT)
Dept: LAB | Facility: LAB | Age: 44
End: 2024-03-07
Payer: COMMERCIAL

## 2024-03-07 ENCOUNTER — OFFICE VISIT (OUTPATIENT)
Dept: PRIMARY CARE | Facility: CLINIC | Age: 44
End: 2024-03-07
Payer: COMMERCIAL

## 2024-03-07 VITALS
OXYGEN SATURATION: 99 % | WEIGHT: 190.5 LBS | TEMPERATURE: 97.3 F | HEART RATE: 81 BPM | DIASTOLIC BLOOD PRESSURE: 88 MMHG | SYSTOLIC BLOOD PRESSURE: 143 MMHG | BODY MASS INDEX: 26.67 KG/M2 | HEIGHT: 71 IN

## 2024-03-07 DIAGNOSIS — Z23 ENCOUNTER FOR IMMUNIZATION: ICD-10-CM

## 2024-03-07 DIAGNOSIS — F11.90 CHRONIC, CONTINUOUS USE OF OPIOIDS: ICD-10-CM

## 2024-03-07 DIAGNOSIS — S32.030S COMPRESSION FRACTURE OF L3 VERTEBRA, SEQUELA: ICD-10-CM

## 2024-03-07 DIAGNOSIS — N18.9 CHRONIC KIDNEY DISEASE, UNSPECIFIED CKD STAGE: ICD-10-CM

## 2024-03-07 DIAGNOSIS — I10 BENIGN ESSENTIAL HTN: ICD-10-CM

## 2024-03-07 DIAGNOSIS — S32.009S CLOSED FRACTURE OF LUMBAR VERTEBRA, UNSPECIFIED FRACTURE MORPHOLOGY, UNSPECIFIED LUMBAR VERTEBRAL LEVEL, SEQUELA: ICD-10-CM

## 2024-03-07 DIAGNOSIS — G89.21 CHRONIC PAIN AFTER TRAUMATIC INJURY: Primary | ICD-10-CM

## 2024-03-07 DIAGNOSIS — G89.4 CHRONIC PAIN DISORDER: ICD-10-CM

## 2024-03-07 DIAGNOSIS — E78.2 MODERATE MIXED HYPERLIPIDEMIA NOT REQUIRING STATIN THERAPY: ICD-10-CM

## 2024-03-07 DIAGNOSIS — G89.21 CHRONIC PAIN AFTER TRAUMATIC INJURY: ICD-10-CM

## 2024-03-07 LAB
ALBUMIN SERPL BCP-MCNC: 4.1 G/DL (ref 3.4–5)
AMPHETAMINES UR QL SCN: NORMAL
ANION GAP SERPL CALC-SCNC: 10 MMOL/L (ref 10–20)
BARBITURATES UR QL SCN: NORMAL
BENZODIAZ UR QL SCN: NORMAL
BUN SERPL-MCNC: 25 MG/DL (ref 6–23)
BZE UR QL SCN: NORMAL
CALCIUM SERPL-MCNC: 9.5 MG/DL (ref 8.6–10.6)
CANNABINOIDS UR QL SCN: NORMAL
CHLORIDE SERPL-SCNC: 103 MMOL/L (ref 98–107)
CO2 SERPL-SCNC: 29 MMOL/L (ref 21–32)
CREAT SERPL-MCNC: 2.3 MG/DL (ref 0.5–1.3)
EGFRCR SERPLBLD CKD-EPI 2021: 35 ML/MIN/1.73M*2
FENTANYL+NORFENTANYL UR QL SCN: NORMAL
GLUCOSE SERPL-MCNC: 189 MG/DL (ref 74–99)
METHADONE UR QL SCN: NORMAL
OPIATES UR QL SCN: NORMAL
OXYCODONE+OXYMORPHONE UR QL SCN: NORMAL
PCP UR QL SCN: NORMAL
PHOSPHATE SERPL-MCNC: 3.2 MG/DL (ref 2.5–4.9)
POTASSIUM SERPL-SCNC: 4.8 MMOL/L (ref 3.5–5.3)
SODIUM SERPL-SCNC: 137 MMOL/L (ref 136–145)

## 2024-03-07 PROCEDURE — 1036F TOBACCO NON-USER: CPT

## 2024-03-07 PROCEDURE — 3077F SYST BP >= 140 MM HG: CPT

## 2024-03-07 PROCEDURE — 99214 OFFICE O/P EST MOD 30 MIN: CPT

## 2024-03-07 PROCEDURE — 90471 IMMUNIZATION ADMIN: CPT

## 2024-03-07 PROCEDURE — 3079F DIAST BP 80-89 MM HG: CPT

## 2024-03-07 PROCEDURE — 4010F ACE/ARB THERAPY RXD/TAKEN: CPT

## 2024-03-07 PROCEDURE — 80069 RENAL FUNCTION PANEL: CPT

## 2024-03-07 PROCEDURE — 90686 IIV4 VACC NO PRSV 0.5 ML IM: CPT

## 2024-03-07 PROCEDURE — 80307 DRUG TEST PRSMV CHEM ANLYZR: CPT

## 2024-03-07 PROCEDURE — 36415 COLL VENOUS BLD VENIPUNCTURE: CPT

## 2024-03-07 RX ORDER — CHLORTHALIDONE 50 MG/1
50 TABLET ORAL DAILY
Qty: 90 TABLET | Refills: 3 | Status: SHIPPED | OUTPATIENT
Start: 2024-03-07

## 2024-03-07 RX ORDER — LIDOCAINE 50 MG/G
1 PATCH TOPICAL DAILY
Qty: 30 PATCH | Refills: 2 | Status: SHIPPED | OUTPATIENT
Start: 2024-03-07

## 2024-03-07 RX ORDER — AMLODIPINE BESYLATE 10 MG/1
10 TABLET ORAL DAILY
Qty: 90 TABLET | Refills: 3 | Status: SHIPPED | OUTPATIENT
Start: 2024-03-07

## 2024-03-07 RX ORDER — HYDRALAZINE HYDROCHLORIDE 25 MG/1
25 TABLET, FILM COATED ORAL 3 TIMES DAILY
Qty: 90 TABLET | Refills: 3 | Status: SHIPPED | OUTPATIENT
Start: 2024-03-07

## 2024-03-07 RX ORDER — ATORVASTATIN CALCIUM 40 MG/1
40 TABLET, FILM COATED ORAL NIGHTLY
Qty: 90 TABLET | Refills: 3 | Status: SHIPPED | OUTPATIENT
Start: 2024-03-07

## 2024-03-07 ASSESSMENT — PAIN SCALES - GENERAL: PAINLEVEL: 6

## 2024-03-07 NOTE — PROGRESS NOTES
Precepting Note    Patient was seen in [facility] residency clinic today as part of a multidisciplinary teaching team. I participated in this patient's care as a lui precepting physician.    HTN, MSK pain, controlled narcotic management. Lidocaine patches and Percocet continued therapy. Pending MRI, FU with pain management, renewed BP meds, continue atorvastatin, flu shot today. Plan to schedule follow up visit for health maintenance visit. Discussed plan with co-resident and attending provider, Dr. Ventura.    Jackelyn Naranjo MD  Family Medicine PGY-3

## 2024-03-07 NOTE — PROGRESS NOTES
Subjective   Patient ID: Good Gee is a 44 y.o. male who presents for Follow-up (Refills ).    Refill of pain medication (percocet 5-325mg), patient reports that was taking about 2-3 per day, patient reports that he ran out of the medication.  Has not had the medication for about week to 2 weeks currently    Using OTC tylenol. Motrin to help with the pain reports that has done PT,     OARRS:  No data recorded  I have personally reviewed the OARRS report for Good Gee. I have considered the risks of abuse, dependence, addiction and diversion    Is the patient prescribed a combination of a benzodiazepine and opioid?  Yes, I feel it is clincially indicated to continue the medication and have discussed with the patient risks/benefits/alternatives.    Last Urine Drug Screen:  Recent Results (from the past 8760 hour(s))   Drug Screen, Urine With Reflex to Confirmation    Collection Time: 03/07/24  4:06 PM   Result Value Ref Range    Amphetamine Screen, Urine Presumptive Negative Presumptive Negative    Barbiturate Screen, Urine Presumptive Negative Presumptive Negative    Benzodiazepines Screen, Urine Presumptive Negative Presumptive Negative    Cannabinoid Screen, Urine Presumptive Negative Presumptive Negative    Cocaine Metabolite Screen, Urine Presumptive Negative Presumptive Negative    Fentanyl Screen, Urine Presumptive Negative Presumptive Negative    Opiate Screen, Urine Presumptive Negative Presumptive Negative    Oxycodone Screen, Urine Presumptive Negative Presumptive Negative    PCP Screen, Urine Presumptive Negative Presumptive Negative    Methadone Screen, Urine Presumptive Negative Presumptive Negative     Results are as expected.     Controlled Substance Agreement:  Date of the Last Agreement: 3/28/2023; New agreement signed 3/7/2024  Reviewed Controlled Substance Agreement including but not limited to the benefits, risks, and alternatives to treatment with a Controlled Substance  "medication(s).      Opioids:  What is the patient's goal of therapy? Management of chronic lower back pain secondary to L3 compression fracture  Is this being achieved with current treatment? Yes     I have calculated the patient's Morphine Dose Equivalent (MED):   I have considered referral to Pain Management and/or a specialist, and do not feel it is necessary at this time.  Patient currently attempting to establish care with pain management but having difficulty with getting appointment due to insurance reasons reported by patient     I feel that it is clinically indicated to continue this current medication regimen after consideration of alternative therapies, and other non-opioid treatment.    Opioid Risk Screening:  No data recorded  Patient denies use of any illicit substances and denies family history was well. No mental health history.     Pain Assessment:  Patient endorse pain is around 7 to 8 but can get as high as 9 to 10 on 10 point scale.  Reports utilizing medication will decreased pain to level of 3-5.  Reports that he ran out of his medication prior to this appointment, and was utilizing lidocaine patches as well as OTC tylenol and ibuprofen.  These intervention were somewhat helpful with managing pain.     Additional Complaints:  HPI   HTN  - reports checking blood pressure at home  - endorse getting number of 132/85   - denies CP, SOB, headache, vision changes, palpitation or leg swelling     Amenable to getting flu shot     Review of Systems  ROS negative unless noted in HPI    Objective   Vitals: /88 (BP Location: Right arm, Patient Position: Sitting, BP Cuff Size: Adult long)   Pulse 81   Temp 36.3 °C (97.3 °F) (Temporal)   Ht 1.803 m (5' 11\")   Wt 86.4 kg (190 lb 8 oz)   SpO2 99%   BMI 26.57 kg/m²    143/88 on repeat 138/83    Physical Exam  Vitals reviewed.   Constitutional:       General: He is not in acute distress.     Appearance: Normal appearance. He is not ill-appearing, " toxic-appearing or diaphoretic.   HENT:      Head: Normocephalic and atraumatic.      Right Ear: External ear normal.      Left Ear: External ear normal.      Nose: Nose normal.      Mouth/Throat:      Mouth: Mucous membranes are moist.      Pharynx: Oropharynx is clear.   Eyes:      Conjunctiva/sclera: Conjunctivae normal.   Cardiovascular:      Rate and Rhythm: Normal rate and regular rhythm.      Pulses: Normal pulses.      Heart sounds: Normal heart sounds. No murmur heard.     No friction rub. No gallop.   Pulmonary:      Effort: Pulmonary effort is normal. No respiratory distress.      Breath sounds: Normal breath sounds. No stridor. No wheezing, rhonchi or rales.   Chest:      Chest wall: No tenderness.   Abdominal:      General: Abdomen is flat. Bowel sounds are normal. There is no distension.      Palpations: Abdomen is soft.      Tenderness: There is no abdominal tenderness. There is no guarding.   Musculoskeletal:         General: Normal range of motion.      Cervical back: Normal range of motion and neck supple. No rigidity or tenderness.      Thoracic back: Normal.      Lumbar back: Tenderness and bony tenderness present. Normal range of motion.      Right lower leg: No edema.      Left lower leg: No edema.   Lymphadenopathy:      Cervical: No cervical adenopathy.   Skin:     General: Skin is warm and dry.   Neurological:      General: No focal deficit present.      Mental Status: He is alert and oriented to person, place, and time.      Cranial Nerves: No cranial nerve deficit.      Sensory: No sensory deficit.      Motor: No weakness.   Psychiatric:         Mood and Affect: Mood normal.         Behavior: Behavior normal.         Thought Content: Thought content normal.         Judgment: Judgment normal.       Assessment/Plan   Good Gee is 43 y/o M w/ T1 IDDM (A1C 7.3% 1/14/20) with insulin pump and follows with endocrinology, HTN, HLD, unstaged CKD w/ diabetic nephropathy, constipation, ED, and  chronic back pain secondary to compression fracture of L3 who presented for medication refills with no acute concerns.  Plan below:     Problem List Items Addressed This Visit       Benign essential HTN    -IO BP: 143/88  on repeat 138/83, close to goal of <130/80  - c/w current regiment amlodipine 10mg every day, lisinopril 40mg every day, chorthalidone 50mg every day, hydralazine 25mg TID  Relevant Medications    amLODIPine (Norvasc) 10 mg tablet    hydrALAZINE (Apresoline) 25 mg tablet    chlorthalidone (Hygroton) 50 mg tablet    Other Relevant Orders    Referral to Nephrology    Renal function panel (Completed)    CKD (chronic kidney disease)    - Patient most recent creatinine of 2.42, but unclear baseline since previous results was from 6/24/22  - Patient with known history of diabetic nephropathy   - Patient would benefit with establishing with nephrology for CKD  Relevant Orders    Referral to Nephrology    Renal function panel (Completed)    Compression fracture of third lumbar vertebra (CMS/HCC)    - PDMP, review and patient appropriate for refill   - previous noted reporting work on cessation of opioid therapy  - encouraged patient to implement multimodality approach to pain management  - c/w therapy of OTC tylenol, Lidocaine patches, heating/icing, regularly exercise and PRN percocets  - discussed that patient should refrain from use NSAIDs due to CKD and HTN  - discussed that should not take more than 4g of tylenol per pain, and that percocet medication is combination of opioids and acetaminophen so patient should decrease amount of acetaminophen when also taking percocet for pain, endorse understanding and intends to comply   Relevant Medications    oxyCODONE-acetaminophen (Percocet) 5-325 mg tablet    Fracture lumbar vertebra-closed (CMS/HCC)    Relevant Medications    lidocaine (Lidoderm) 5 % patch    Other Relevant Orders    Drug Screen, Urine With Reflex to Confirmation (Completed)    Hyperlipemia     Relevant Medications    atorvastatin (Lipitor) 40 mg tablet     Other Visit Diagnoses       Chronic pain after traumatic injury    -  Primary    Relevant Medications    lidocaine (Lidoderm) 5 % patch    Other Relevant Orders    Drug Screen, Urine With Reflex to Confirmation (Completed)    Encounter for immunization        Chronic pain disorder        Relevant Medications    lidocaine (Lidoderm) 5 % patch    Other Relevant Orders    Drug Screen, Urine With Reflex to Confirmation (Completed)    Chronic, continuous use of opioids        Relevant Medications    naloxone (Narcan) 4 mg/0.1 mL nasal spray    Other Relevant Orders    Drug Screen, Urine With Reflex to Confirmation (Completed)          Patient was examined and discussed with Dr. Ventura     **RTC in 4-6 weeks for follow up on multiple chronic medical conditions, or sooner if needed     SHON Brumfield MD MS  PGY-2 Family Medicine

## 2024-03-08 PROCEDURE — RXMED WILLOW AMBULATORY MEDICATION CHARGE

## 2024-03-08 RX ORDER — OXYCODONE AND ACETAMINOPHEN 5; 325 MG/1; MG/1
1 TABLET ORAL EVERY 12 HOURS PRN
Qty: 50 TABLET | Refills: 0 | Status: SHIPPED | OUTPATIENT
Start: 2024-03-08 | End: 2024-04-12 | Stop reason: SDUPTHER

## 2024-03-08 RX ORDER — NALOXONE HYDROCHLORIDE 4 MG/.1ML
4 SPRAY NASAL AS NEEDED
Qty: 2 EACH | Refills: 1 | Status: SHIPPED | OUTPATIENT
Start: 2024-03-08

## 2024-03-11 NOTE — PROGRESS NOTES
Home I saw and evaluated the patient. I personally obtained the key and critical portions of the history and physical exam or was physically present for key and critical portions performed by the resident/fellow. I reviewed the resident/fellow's documentation and discussed the patient with the resident/fellow. I agree with the resident/fellow's medical decision making as documented in the note.    Coy Ventura MD       left upper arm

## 2024-03-12 ENCOUNTER — PHARMACY VISIT (OUTPATIENT)
Dept: PHARMACY | Facility: CLINIC | Age: 44
End: 2024-03-12
Payer: MEDICAID

## 2024-04-09 PROCEDURE — RXMED WILLOW AMBULATORY MEDICATION CHARGE

## 2024-04-11 ENCOUNTER — PHARMACY VISIT (OUTPATIENT)
Dept: PHARMACY | Facility: CLINIC | Age: 44
End: 2024-04-11
Payer: MEDICAID

## 2024-04-12 DIAGNOSIS — S32.030S COMPRESSION FRACTURE OF L3 VERTEBRA, SEQUELA: ICD-10-CM

## 2024-04-12 RX ORDER — OXYCODONE AND ACETAMINOPHEN 5; 325 MG/1; MG/1
1 TABLET ORAL EVERY 12 HOURS PRN
Qty: 50 TABLET | Refills: 0 | Status: SHIPPED | OUTPATIENT
Start: 2024-04-12 | End: 2024-05-09 | Stop reason: SDUPTHER

## 2024-04-12 NOTE — PROGRESS NOTES
Patient paged the  inpatient service requesting a refill of percocet 325mg-5mg prescription for chronic pain secondary to traumatic accident.  Review PMDP, patient appropriate for refill, and has CSA agreement.  Reminded patient to follow up with provider at next schedule appointment.     SHON Brumfield MD MS  PGY-2 Family Medicine

## 2024-04-13 ENCOUNTER — PHARMACY VISIT (OUTPATIENT)
Dept: PHARMACY | Facility: CLINIC | Age: 44
End: 2024-04-13
Payer: MEDICAID

## 2024-04-13 PROCEDURE — RXMED WILLOW AMBULATORY MEDICATION CHARGE

## 2024-05-01 ENCOUNTER — LAB (OUTPATIENT)
Dept: LAB | Facility: LAB | Age: 44
End: 2024-05-01
Payer: COMMERCIAL

## 2024-05-01 ENCOUNTER — OFFICE VISIT (OUTPATIENT)
Dept: NEPHROLOGY | Facility: CLINIC | Age: 44
End: 2024-05-01
Payer: COMMERCIAL

## 2024-05-01 VITALS
DIASTOLIC BLOOD PRESSURE: 90 MMHG | WEIGHT: 201 LBS | HEIGHT: 71 IN | SYSTOLIC BLOOD PRESSURE: 151 MMHG | BODY MASS INDEX: 28.14 KG/M2 | HEART RATE: 65 BPM

## 2024-05-01 DIAGNOSIS — N18.9 CHRONIC KIDNEY DISEASE, UNSPECIFIED CKD STAGE: ICD-10-CM

## 2024-05-01 DIAGNOSIS — E08.22 DIABETES MELLITUS DUE TO UNDERLYING CONDITION WITH DIABETIC CHRONIC KIDNEY DISEASE, UNSPECIFIED CKD STAGE, UNSPECIFIED WHETHER LONG TERM INSULIN USE (MULTI): Primary | ICD-10-CM

## 2024-05-01 DIAGNOSIS — I10 BENIGN ESSENTIAL HTN: ICD-10-CM

## 2024-05-01 DIAGNOSIS — E08.22 DIABETES MELLITUS DUE TO UNDERLYING CONDITION WITH DIABETIC CHRONIC KIDNEY DISEASE, UNSPECIFIED CKD STAGE, UNSPECIFIED WHETHER LONG TERM INSULIN USE (MULTI): ICD-10-CM

## 2024-05-01 DIAGNOSIS — R80.8 OTHER PROTEINURIA: ICD-10-CM

## 2024-05-01 DIAGNOSIS — E21.3 HYPERPARATHYROIDISM (MULTI): Primary | ICD-10-CM

## 2024-05-01 DIAGNOSIS — E10.641: ICD-10-CM

## 2024-05-01 LAB
ANION GAP SERPL CALC-SCNC: 10 MMOL/L (ref 10–20)
BUN SERPL-MCNC: 26 MG/DL (ref 6–23)
CALCIUM SERPL-MCNC: 8.7 MG/DL (ref 8.6–10.3)
CHLORIDE SERPL-SCNC: 107 MMOL/L (ref 98–107)
CHOLEST SERPL-MCNC: 142 MG/DL (ref 0–199)
CHOLESTEROL/HDL RATIO: 3.3
CO2 SERPL-SCNC: 27 MMOL/L (ref 21–32)
CREAT SERPL-MCNC: 2.34 MG/DL (ref 0.5–1.3)
CREAT UR-MCNC: 151 MG/DL (ref 20–370)
CREAT UR-MCNC: 151.7 MG/DL (ref 20–370)
EGFRCR SERPLBLD CKD-EPI 2021: 34 ML/MIN/1.73M*2
GLUCOSE SERPL-MCNC: 181 MG/DL (ref 74–99)
HDLC SERPL-MCNC: 43.4 MG/DL
LDLC SERPL CALC-MCNC: 67 MG/DL
MICROALBUMIN UR-MCNC: 384.8 MG/L
MICROALBUMIN/CREAT UR: 253.7 UG/MG CREAT
NON HDL CHOLESTEROL: 99 MG/DL (ref 0–149)
POTASSIUM SERPL-SCNC: 4.1 MMOL/L (ref 3.5–5.3)
PROT UR-ACNC: 69 MG/DL (ref 5–25)
PROT/CREAT UR: 0.46 MG/MG CREAT (ref 0–0.17)
PTH-INTACT SERPL-MCNC: 228.7 PG/ML (ref 18.5–88)
RBC #/AREA URNS AUTO: NORMAL /HPF
SODIUM SERPL-SCNC: 140 MMOL/L (ref 136–145)
TRIGL SERPL-MCNC: 157 MG/DL (ref 0–149)
VLDL: 31 MG/DL (ref 0–40)
WBC #/AREA URNS AUTO: NORMAL /HPF

## 2024-05-01 PROCEDURE — 3077F SYST BP >= 140 MM HG: CPT | Performed by: INTERNAL MEDICINE

## 2024-05-01 PROCEDURE — 82043 UR ALBUMIN QUANTITATIVE: CPT

## 2024-05-01 PROCEDURE — 36415 COLL VENOUS BLD VENIPUNCTURE: CPT

## 2024-05-01 PROCEDURE — 84156 ASSAY OF PROTEIN URINE: CPT

## 2024-05-01 PROCEDURE — 80048 BASIC METABOLIC PNL TOTAL CA: CPT

## 2024-05-01 PROCEDURE — 83970 ASSAY OF PARATHORMONE: CPT

## 2024-05-01 PROCEDURE — 82570 ASSAY OF URINE CREATININE: CPT

## 2024-05-01 PROCEDURE — 99204 OFFICE O/P NEW MOD 45 MIN: CPT | Performed by: INTERNAL MEDICINE

## 2024-05-01 PROCEDURE — 81001 URINALYSIS AUTO W/SCOPE: CPT

## 2024-05-01 PROCEDURE — 4010F ACE/ARB THERAPY RXD/TAKEN: CPT | Performed by: INTERNAL MEDICINE

## 2024-05-01 PROCEDURE — 1036F TOBACCO NON-USER: CPT | Performed by: INTERNAL MEDICINE

## 2024-05-01 PROCEDURE — 82306 VITAMIN D 25 HYDROXY: CPT

## 2024-05-01 PROCEDURE — 3080F DIAST BP >= 90 MM HG: CPT | Performed by: INTERNAL MEDICINE

## 2024-05-01 PROCEDURE — 80061 LIPID PANEL: CPT

## 2024-05-01 RX ORDER — NEBIVOLOL 5 MG/1
5 TABLET ORAL DAILY
Qty: 90 TABLET | Refills: 3 | Status: SHIPPED | OUTPATIENT
Start: 2024-05-01 | End: 2025-05-01

## 2024-05-01 NOTE — PROGRESS NOTES
Good Gee   44 y.o.      Vitals:    05/01/24 1432   Weight: 91.2 kg (201 lb)      MRN/Room: [id number]/Room/bed info not found        History Of Present Illness  Good Gee is a 44 y.o. male presenting with high Cr level.     Past Medical History  He has a past medical history of Other non-diabetic proliferative retinopathy, bilateral (09/20/2019), Personal history of other diseases of the circulatory system, Personal history of other diseases of urinary system, and Type 2 diabetes mellitus with ketoacidosis without coma (Multi).    Surgical History  He has a past surgical history that includes CT angio coronary art with heartflow if score >30% (8/13/2019).     Social History  He reports that he has never smoked. He has never used smokeless tobacco. He reports current alcohol use. He reports that he does not use drugs.    Family History  Family History   Problem Relation Name Age of Onset    Aneurysm Father      Diabetes Maternal Grandmother      Diabetes Other aunt         Allergies  Shellfish derived    Meds:         Current Outpatient Medications   Medication Sig Dispense Refill    acetaminophen (Tylenol) 500 mg tablet take 2 tablets BY by mouth every 6 hours if needed      amLODIPine (Norvasc) 10 mg tablet Take 1 tablet (10 mg) by mouth once daily. 90 tablet 3    atorvastatin (Lipitor) 40 mg tablet Take 1 tablet (40 mg) by mouth once daily at bedtime. 90 tablet 3    blood sugar diagnostic (Accu-Chek Guide test strips) strip TEST 5 TIMES DAILY 500 strip 3    blood sugar diagnostic (OneTouch Verio test strips) strip 5 strips once daily. 400 strip 3    blood-glucose meter (OneTouch Verio Flex meter) misc 1 kit if needed (to test glucose 5 times daily). 1 each 0    cetirizine (ZyrTEC) 10 mg tablet Take 1 tablet (10 mg) by mouth once daily. TAKE 1 TABLET Daily PRN seasonal allergies 90 tablet 3    chlorthalidone (Hygroton) 50 mg tablet Take 1 tablet (50 mg) by mouth once daily. 90 tablet 3    ergocalciferol (Vitamin  "D-2) 1.25 MG (07556 UT) capsule Take 1 capsule (1,250 mcg) by mouth 1 (one) time per week. 12 capsule 2    fluticasone (Flonase) 50 mcg/actuation nasal spray Administer 1 spray into affected nostril(s) 2 times a day.      glucagon 1 mg injection USE AS DIRECTED.      hydrALAZINE (Apresoline) 25 mg tablet Take 1 tablet (25 mg) by mouth 3 times a day. 90 tablet 3    hydrOXYzine HCL (Atarax) 25 mg tablet Take 1 tablet (25 mg) by mouth every 4 hours if needed.      insulin lispro (HumaLOG) 100 unit/mL injection Inject 0.6 mL (60 Units) under the skin once daily. VIA INSULIN PUMP 60 mL 1    lancets (OneTouch Delica Plus Lancet) 33 gauge misc 5 Needles 5 times a day. 400 each 3    lancing device/lancets (ACCU-CHEK FASTCLIX LANCING DEV MISC) Patient is to test up to 5 times daily as needed      lidocaine (Lidoderm) 5 % patch Place 1 patch over 12 hours on the skin once daily. APPLY 1 PATCH TO THE AFFECTED AREA AND LEAVE IN PLACE FOR 12 HOURS, THEN REMOVE AND LEAVE OFF FOR 12 HOURS. 30 patch 2    lisinopril 40 mg tablet Take 1 tablet (40 mg) by mouth once daily. TAKE 1 TABLET DAILY FOR BLOOD PRESSURE. please schedule follow up appointment 90 tablet 3    naloxone (Narcan) 4 mg/0.1 mL nasal spray Administer 1 spray (4 mg) into affected nostril(s) if needed for opioid reversal or respiratory depression. 2 each 1    oxyCODONE-acetaminophen (Percocet) 5-325 mg tablet Take 1 tablet by mouth every 12 hours if needed for severe pain (7 - 10). 50 tablet 0    pen needle, diabetic 32 gauge x 5/32\" needle       polyethylene glycol (Glycolax) 17 gram/dose powder Take by mouth.      sildenafil (Viagra) 25 mg tablet TAKE 1 TABLET DAILY 1 HOUR BEFORE NEEDED       No current facility-administered medications for this visit.         ROS:  The patient is awake and oriented. No dizziness or lightheadedness. No chills and no fever. No headaches. No nausea and no vomiting. No shortness of breath. No cough. No sputum. No chest pain. No chest " tightness. No abdominal pain. No diarrhea and no constipation. No hematemesis or hemoptysis. No hematuria. No rectal bleeding. No melena. No epistaxis. No urinary symptoms. No flank pain. No leg edema. No leg pain. No weakness. No itching. Overall, the rest of the review of systems is also negative.  12 point review of systems otherwise negative as stated in HPI.        Physical Exam:        Vitals:    05/01/24 1432   BP: 151/90   Pulse: 65     General: The patient is awake, oriented, and is not in any distress.  Head and Neck: Normocephalic. No periorbital edema.  Respiratory: Symmetric air entry. Symmetric chest expansion.No respiratory distress.  Cardiovascular: Symmetric peripheral pulses.  Skin: No maculopapular rash.  Musculoskeletal: No peripheral edema in both left and right upper extremities.  No edema in either left or right lower extremities.  Neuro Exam: Speech is fluent. Moves extremities.        Blood Labs:  No results found for this or any previous visit (from the past 24 hour(s)).   Lab Results   Component Value Date    GLUCOSE 189 (H) 03/07/2024    CALCIUM 9.5 03/07/2024     03/07/2024    K 4.8 03/07/2024    CO2 29 03/07/2024     03/07/2024    BUN 25 (H) 03/07/2024    CREATININE 2.30 (H) 03/07/2024       Imaging:        Assessment and Plan:  #1 chronic kidney disease stage III.  Last creatinine was 2.3.  Review of chart shows long history of chronic kidney disease.  The patient has history of long history of diabetes and hypertension.  More potassium and bicarb level.  Volume status is good.  I asked for a basic metabolic panel, PTH, phosphorus, 25-hydroxy vitamin D, microscopic urinalysis, and a spot urine protein to creatinine ratio.    2.  Hypertension.  Blood pressure is high.  He is on multiple antihypertensive medications.  We talked about low-salt diet.  I added Bystolic to his medications.    3.  Proteinuria.  Last spot urine albumin to creatinine ratio shows more than 800 mg  albuminuria.  This is because of diabetic nephropathy.  He is on maximum dose of lisinopril.  Since he has type 1 diabetes he is not a candidate for an SGLT2 inhibitor.    See him in about 2 to 3 weeks for follow-up.    Luke Leyva MD  Senior Attending Physician  Director of Onco-Nephrology Program  Division of Nephrology & Hypertension  [facility]

## 2024-05-02 ENCOUNTER — OFFICE VISIT (OUTPATIENT)
Dept: ENDOCRINOLOGY | Facility: CLINIC | Age: 44
End: 2024-05-02
Payer: COMMERCIAL

## 2024-05-02 VITALS
WEIGHT: 201.8 LBS | SYSTOLIC BLOOD PRESSURE: 124 MMHG | BODY MASS INDEX: 28.25 KG/M2 | DIASTOLIC BLOOD PRESSURE: 74 MMHG | HEART RATE: 74 BPM | HEIGHT: 71 IN

## 2024-05-02 DIAGNOSIS — N18.32 STAGE 3B CHRONIC KIDNEY DISEASE (MULTI): ICD-10-CM

## 2024-05-02 DIAGNOSIS — E10.65 UNCONTROLLED TYPE 1 DIABETES MELLITUS WITH HYPERGLYCEMIA (MULTI): ICD-10-CM

## 2024-05-02 DIAGNOSIS — Z97.8 USES SELF-APPLIED CONTINUOUS GLUCOSE MONITORING DEVICE: ICD-10-CM

## 2024-05-02 DIAGNOSIS — Z96.41 INSULIN PUMP IN PLACE: Primary | ICD-10-CM

## 2024-05-02 LAB
25(OH)D3 SERPL-MCNC: 84 NG/ML (ref 30–100)
POC FINGERSTICK BLOOD GLUCOSE: 199 MG/DL (ref 70–100)
POC HEMOGLOBIN A1C: 7.7 % (ref 4.2–6.5)

## 2024-05-02 PROCEDURE — 83036 HEMOGLOBIN GLYCOSYLATED A1C: CPT | Performed by: STUDENT IN AN ORGANIZED HEALTH CARE EDUCATION/TRAINING PROGRAM

## 2024-05-02 PROCEDURE — 3062F POS MACROALBUMINURIA REV: CPT | Performed by: STUDENT IN AN ORGANIZED HEALTH CARE EDUCATION/TRAINING PROGRAM

## 2024-05-02 PROCEDURE — 82962 GLUCOSE BLOOD TEST: CPT | Performed by: STUDENT IN AN ORGANIZED HEALTH CARE EDUCATION/TRAINING PROGRAM

## 2024-05-02 PROCEDURE — 95251 CONT GLUC MNTR ANALYSIS I&R: CPT | Performed by: STUDENT IN AN ORGANIZED HEALTH CARE EDUCATION/TRAINING PROGRAM

## 2024-05-02 PROCEDURE — 1036F TOBACCO NON-USER: CPT | Performed by: STUDENT IN AN ORGANIZED HEALTH CARE EDUCATION/TRAINING PROGRAM

## 2024-05-02 PROCEDURE — 4010F ACE/ARB THERAPY RXD/TAKEN: CPT | Performed by: STUDENT IN AN ORGANIZED HEALTH CARE EDUCATION/TRAINING PROGRAM

## 2024-05-02 PROCEDURE — 99214 OFFICE O/P EST MOD 30 MIN: CPT | Performed by: STUDENT IN AN ORGANIZED HEALTH CARE EDUCATION/TRAINING PROGRAM

## 2024-05-02 PROCEDURE — 3048F LDL-C <100 MG/DL: CPT | Performed by: STUDENT IN AN ORGANIZED HEALTH CARE EDUCATION/TRAINING PROGRAM

## 2024-05-02 PROCEDURE — 3074F SYST BP LT 130 MM HG: CPT | Performed by: STUDENT IN AN ORGANIZED HEALTH CARE EDUCATION/TRAINING PROGRAM

## 2024-05-02 PROCEDURE — 3078F DIAST BP <80 MM HG: CPT | Performed by: STUDENT IN AN ORGANIZED HEALTH CARE EDUCATION/TRAINING PROGRAM

## 2024-05-02 RX ORDER — CALCITRIOL 0.25 UG/1
0.25 CAPSULE ORAL EVERY OTHER DAY
Qty: 45 CAPSULE | Refills: 3 | Status: SHIPPED | OUTPATIENT
Start: 2024-05-02 | End: 2025-05-02

## 2024-05-02 ASSESSMENT — ENCOUNTER SYMPTOMS: CONSTITUTIONAL NEGATIVE: 1

## 2024-05-02 NOTE — PROGRESS NOTES
Subjective   Patient ID: Good Gee is a 44 y.o. male who presents for Diabetes ((Dx dm1: age 25 /PCP:Katja /Podiatry: does not see one /Eye exam: scheduled soon per patient/Patient is has 770G Medtronic pump and cgm  /Patient is testing his sugars up to 6 times per day. /Due to fluctuating blood glucose, hypoglycemia and insulin pump management. ).  Lab Results   Component Value Date    HGBA1C 8.9 (A) 01/18/2024      HPI  The patient is a 43 yo male with PMH of DM1 diagnosed at age 25 , presents to Endocrinology for DM follow up.   He is now on closed loop Medtronic 770 G.  automode 85 % , manual 14%  Sensor 87 %   A1c is at  7.7 % 8.9 , was 7.5     pump downloads reviewed and will be scanned in EMR  total insulin dose 42 units   basal 52 % , bolus 48%    Review of Systems   Constitutional: Negative.        Objective   Physical Exam  Constitutional:       Appearance: Normal appearance.   Cardiovascular:      Rate and Rhythm: Normal rate and regular rhythm.   Pulmonary:      Effort: Pulmonary effort is normal.      Breath sounds: Normal breath sounds.   Neurological:      General: No focal deficit present.      Mental Status: He is alert.   Psychiatric:         Mood and Affect: Mood normal.      Visit Vitals  Wt 91.5 kg (201 lb 12.8 oz)   BMI 28.15 kg/m²   Smoking Status Never   BSA 2.14 m²         Assessment/Plan        The patient is a 43 yo male with PMH of DM1 diagnosed at age 25 , presents to Endocrinology for DM follow up. He is on Medtronic 780 G ( was on 770 G )  - Uncontrolled DM1 with Hba1c of 7.7 was  8.9 on Medtronic 780 pump on automode   - complicated by hypoglycemia usually at work , no recent hypoglycemia episodes   -HTN ,DM nephropathy urine albumin 815 mg/L on Lisinopril 40, amlodipine managed by nephrology ( now established with Dr. Leyva)  - HLD LDL 94 on atorvastatin 40 mg   -DM retinopathy s/p eye injection of eyes and laser treatment. Following with ophthalmology   - SDH previously on  Dexamethasone, repeat 3/2021 Ct head showed improvement in SDH following with NSY.     Plan :  Continue current 780 pump on current setting , target    He will start  using guardian 4 soon  basal    -4:30 0.85 u/hr      4:30- 9 am 0.75 u/hr     9 am - 00:00 0.85 u/hr     - carb ratio 00:00 1:10    13:00 1:9  - sensitivity  38      RTC in 3 months

## 2024-05-09 ENCOUNTER — OFFICE VISIT (OUTPATIENT)
Dept: PRIMARY CARE | Facility: CLINIC | Age: 44
End: 2024-05-09
Payer: COMMERCIAL

## 2024-05-09 VITALS
HEART RATE: 68 BPM | BODY MASS INDEX: 28 KG/M2 | WEIGHT: 200 LBS | HEIGHT: 71 IN | OXYGEN SATURATION: 98 % | TEMPERATURE: 96.9 F | SYSTOLIC BLOOD PRESSURE: 142 MMHG | DIASTOLIC BLOOD PRESSURE: 81 MMHG

## 2024-05-09 DIAGNOSIS — S32.030S COMPRESSION FRACTURE OF L3 VERTEBRA, SEQUELA: ICD-10-CM

## 2024-05-09 PROCEDURE — 99214 OFFICE O/P EST MOD 30 MIN: CPT

## 2024-05-09 PROCEDURE — 3077F SYST BP >= 140 MM HG: CPT

## 2024-05-09 PROCEDURE — 4010F ACE/ARB THERAPY RXD/TAKEN: CPT

## 2024-05-09 PROCEDURE — 3079F DIAST BP 80-89 MM HG: CPT

## 2024-05-09 PROCEDURE — 3048F LDL-C <100 MG/DL: CPT

## 2024-05-09 PROCEDURE — 3062F POS MACROALBUMINURIA REV: CPT

## 2024-05-09 PROCEDURE — 1036F TOBACCO NON-USER: CPT

## 2024-05-09 RX ORDER — COVID-19 ANTIGEN TEST
KIT MISCELLANEOUS
COMMUNITY
Start: 2024-03-08

## 2024-05-09 RX ORDER — OXYCODONE AND ACETAMINOPHEN 5; 325 MG/1; MG/1
1 TABLET ORAL EVERY 12 HOURS PRN
Qty: 50 TABLET | Refills: 0 | Status: SHIPPED | OUTPATIENT
Start: 2024-05-13 | End: 2024-06-12

## 2024-05-09 RX ORDER — OXYCODONE AND ACETAMINOPHEN 5; 325 MG/1; MG/1
1 TABLET ORAL EVERY 12 HOURS PRN
Qty: 50 TABLET | Refills: 0 | Status: SHIPPED | OUTPATIENT
Start: 2024-05-09 | End: 2024-05-09

## 2024-05-09 ASSESSMENT — ENCOUNTER SYMPTOMS
DEPRESSION: 0
OCCASIONAL FEELINGS OF UNSTEADINESS: 0
LOSS OF SENSATION IN FEET: 0
BACK PAIN: 1

## 2024-05-09 ASSESSMENT — PAIN SCALES - GENERAL: PAINLEVEL: 9

## 2024-05-09 NOTE — PROGRESS NOTES
Subjective   Patient ID:   Good Gee is a 44 y.o. male who presents for Med Refill.  Med Refill      #Chronic low back pain  OARRS:  No data recorded  I have personally reviewed the OARRS report for Good Gee. I have considered the risks of abuse, dependence, addiction and diversion    Is the patient prescribed a combination of a benzodiazepine and opioid?  No    Last Urine Drug Screen / ordered today: No  Recent Results (from the past 8760 hour(s))   Drug Screen, Urine With Reflex to Confirmation    Collection Time: 03/07/24  4:06 PM   Result Value Ref Range    Amphetamine Screen, Urine Presumptive Negative Presumptive Negative    Barbiturate Screen, Urine Presumptive Negative Presumptive Negative    Benzodiazepines Screen, Urine Presumptive Negative Presumptive Negative    Cannabinoid Screen, Urine Presumptive Negative Presumptive Negative    Cocaine Metabolite Screen, Urine Presumptive Negative Presumptive Negative    Fentanyl Screen, Urine Presumptive Negative Presumptive Negative    Opiate Screen, Urine Presumptive Negative Presumptive Negative    Oxycodone Screen, Urine Presumptive Negative Presumptive Negative    PCP Screen, Urine Presumptive Negative Presumptive Negative    Methadone Screen, Urine Presumptive Negative Presumptive Negative     N/A        Controlled Substance Agreement:  Date of the Last Agreement: 3/7/2024  Reviewed Controlled Substance Agreement including but not limited to the benefits, risks, and alternatives to treatment with a Controlled Substance medication(s).    Opioids:  What is the patient's goal of therapy? Make pain more tolerable  Is this being achieved with current treatment? yes    I have calculated the patient's Morphine Dose Equivalent (MED):   I have considered referral to Pain Management and/or a specialist, and do not feel it is necessary at this time.    I feel that it is clinically indicated to continue this current medication regimen after consideration of  "alternative therapies, and other non-opioid treatment.    Opioid Risk Screening:  No data recorded    Pain Assessment:  No data recorded    #Right leg pain  - Reported that he has been having right leg pain for the past 4-5 days  - He does not recall doing any strenuous activity and denied any injury  - He describes the pain as a soreness and is located in his thigh  - He also describes a on and off shooting pain that goes down his lower back to his right thigh  - He has had similar episodes in the past which have resolved on their own  - He has been using Lidocaine patches and heating pack for the pain which have been helping  - The pain does not interfere with ambulation or his daily activities    Review of Systems   Musculoskeletal:  Positive for back pain.       Objective   /81 (BP Location: Left arm, Patient Position: Sitting)   Pulse 68   Temp 36.1 °C (96.9 °F) (Temporal)   Ht 1.803 m (5' 11\")   Wt 90.7 kg (200 lb)   SpO2 98%   BMI 27.89 kg/m²    Physical Exam  Constitutional:       General: He is not in acute distress.     Appearance: Normal appearance. He is not ill-appearing.   HENT:      Head: Normocephalic and atraumatic.   Eyes:      Extraocular Movements: Extraocular movements intact.   Cardiovascular:      Rate and Rhythm: Normal rate and regular rhythm.      Heart sounds: Normal heart sounds. No murmur heard.     No friction rub. No gallop.   Pulmonary:      Effort: Pulmonary effort is normal.      Breath sounds: Normal breath sounds.   Musculoskeletal:      Cervical back: Normal range of motion.   Skin:     General: Skin is warm and dry.   Neurological:      General: No focal deficit present.      Mental Status: He is alert.   Psychiatric:         Mood and Affect: Mood normal.         Behavior: Behavior normal.         Assessment/Plan     Problem List Items Addressed This Visit    None  Good Gee is 43 y/o M w/ T1 IDDM (A1C 7.3% 1/14/20) with insulin pump and follows with endocrinology, " HTN, HLD, unstaged CKD w/ diabetic nephropathy, constipation, ED, and chronic back pain secondary to compression fracture of L3 who presented for medication refills     #Chronic low back pain 2/2 to L3 compression fracture  - OARRS reviewed and appropriate  - Recent Urine drug screen from 3/2024  - Refilled Percocet 5-325 mg BID PRN for pain  - Goal is to wean patient off Percocet in the future, Consider lowering number of pills on future visit  - c/w Tylenol, Ibuprofen, & Lidocaine patches PRN   - Referral for aquatic PT placed    #Right leg pain  - Encouraged to continue with Tylenol, Lidocaine, and heat for symptom relief   - Referral to aquatic PT placed       RTC in 3 months for a medication refill.    The patient was discussed with Dr. Ortega.    Cosme Hightower MD  Family Medicine   PGY-2

## 2024-05-13 PROCEDURE — RXMED WILLOW AMBULATORY MEDICATION CHARGE

## 2024-05-18 ENCOUNTER — PHARMACY VISIT (OUTPATIENT)
Dept: PHARMACY | Facility: CLINIC | Age: 44
End: 2024-05-18
Payer: COMMERCIAL

## 2024-05-24 PROCEDURE — RXMED WILLOW AMBULATORY MEDICATION CHARGE

## 2024-05-26 ENCOUNTER — PHARMACY VISIT (OUTPATIENT)
Dept: PHARMACY | Facility: CLINIC | Age: 44
End: 2024-05-26
Payer: MEDICAID

## 2024-06-03 DIAGNOSIS — E10.641: ICD-10-CM

## 2024-06-03 RX ORDER — INSULIN LISPRO 100 [IU]/ML
INJECTION, SOLUTION INTRAVENOUS; SUBCUTANEOUS
Qty: 60 ML | Refills: 0 | Status: SHIPPED | OUTPATIENT
Start: 2024-06-03

## 2024-06-22 PROCEDURE — RXMED WILLOW AMBULATORY MEDICATION CHARGE

## 2024-06-26 ENCOUNTER — PHARMACY VISIT (OUTPATIENT)
Dept: PHARMACY | Facility: CLINIC | Age: 44
End: 2024-06-26
Payer: MEDICAID

## 2024-06-27 ENCOUNTER — PHARMACY VISIT (OUTPATIENT)
Dept: PHARMACY | Facility: CLINIC | Age: 44
End: 2024-06-27
Payer: MEDICAID

## 2024-06-27 DIAGNOSIS — G89.21 CHRONIC PAIN DUE TO TRAUMA: Primary | ICD-10-CM

## 2024-06-27 DIAGNOSIS — S32.020D COMPRESSION FRACTURE OF L2 VERTEBRA WITH ROUTINE HEALING, SUBSEQUENT ENCOUNTER: ICD-10-CM

## 2024-06-27 PROCEDURE — RXMED WILLOW AMBULATORY MEDICATION CHARGE

## 2024-06-27 RX ORDER — OXYCODONE AND ACETAMINOPHEN 5; 325 MG/1; MG/1
1 TABLET ORAL 2 TIMES DAILY PRN
Qty: 50 TABLET | Refills: 0 | Status: SHIPPED | OUTPATIENT
Start: 2024-06-27 | End: 2024-07-22

## 2024-06-27 RX ORDER — OXYCODONE AND ACETAMINOPHEN 5; 325 MG/1; MG/1
1 TABLET ORAL 2 TIMES DAILY PRN
Qty: 50 TABLET | Refills: 0 | Status: SHIPPED | OUTPATIENT
Start: 2024-06-27 | End: 2024-06-27

## 2024-07-30 ENCOUNTER — APPOINTMENT (OUTPATIENT)
Dept: PRIMARY CARE | Facility: CLINIC | Age: 44
End: 2024-07-30
Payer: COMMERCIAL

## 2024-08-02 ENCOUNTER — APPOINTMENT (OUTPATIENT)
Dept: PRIMARY CARE | Facility: CLINIC | Age: 44
End: 2024-08-02
Payer: COMMERCIAL

## 2024-08-06 ENCOUNTER — APPOINTMENT (OUTPATIENT)
Dept: NEPHROLOGY | Facility: CLINIC | Age: 44
End: 2024-08-06
Payer: COMMERCIAL

## 2024-08-09 ENCOUNTER — TELEPHONE (OUTPATIENT)
Dept: PRIMARY CARE | Facility: CLINIC | Age: 44
End: 2024-08-09
Payer: COMMERCIAL

## 2024-08-09 NOTE — TELEPHONE ENCOUNTER
Rx Refill Request Telephone Encounter    Name:  Good Gee  :  [id number]  Medication Name:  Oxycodone            Specific Pharmacy location:   [facility]    Date of last appointment:  24  Date of next appointment:    Best number to reach patient: [phone]

## 2024-08-16 NOTE — TELEPHONE ENCOUNTER
Pt called requesting refill of percocet. Noted provider note via last appt note stating intention to attempt to wean pt off of med at next visit. Next visit October. Noted med discontinued on June 27th via provider. Message sent to provider r/t pt request.

## 2024-08-22 ENCOUNTER — TELEPHONE (OUTPATIENT)
Dept: PRIMARY CARE | Facility: CLINIC | Age: 44
End: 2024-08-22
Payer: COMMERCIAL

## 2024-08-22 NOTE — TELEPHONE ENCOUNTER
Patient called in requesting refill of pain meds. Per previous message I informed patient the refill can't be filled until he is seen,. Patient is currently scheduled for 10/01 as he no showed aopointment on 8/01. Please advise if you would like patient to be seen sooner. As of now everyone is booked through Oct-Nov. Tho advise if a over book is needed should patient need to be seen sooner.  Thanks!

## 2024-08-28 ENCOUNTER — APPOINTMENT (OUTPATIENT)
Dept: ENDOCRINOLOGY | Facility: CLINIC | Age: 44
End: 2024-08-28
Payer: COMMERCIAL

## 2024-08-28 VITALS
SYSTOLIC BLOOD PRESSURE: 150 MMHG | DIASTOLIC BLOOD PRESSURE: 90 MMHG | WEIGHT: 204 LBS | BODY MASS INDEX: 28.56 KG/M2 | HEIGHT: 71 IN

## 2024-08-28 DIAGNOSIS — E10.65 UNCONTROLLED TYPE 1 DIABETES MELLITUS WITH HYPERGLYCEMIA (MULTI): Primary | ICD-10-CM

## 2024-08-28 DIAGNOSIS — Z96.41 INSULIN PUMP IN PLACE: ICD-10-CM

## 2024-08-28 DIAGNOSIS — Z97.8 USES SELF-APPLIED CONTINUOUS GLUCOSE MONITORING DEVICE: ICD-10-CM

## 2024-08-28 LAB
POC FINGERSTICK BLOOD GLUCOSE: 172 MG/DL (ref 70–100)
POC HEMOGLOBIN A1C: 7.3 % (ref 4.2–6.5)

## 2024-08-28 PROCEDURE — 3062F POS MACROALBUMINURIA REV: CPT | Performed by: STUDENT IN AN ORGANIZED HEALTH CARE EDUCATION/TRAINING PROGRAM

## 2024-08-28 PROCEDURE — 82962 GLUCOSE BLOOD TEST: CPT | Performed by: STUDENT IN AN ORGANIZED HEALTH CARE EDUCATION/TRAINING PROGRAM

## 2024-08-28 PROCEDURE — 3077F SYST BP >= 140 MM HG: CPT | Performed by: STUDENT IN AN ORGANIZED HEALTH CARE EDUCATION/TRAINING PROGRAM

## 2024-08-28 PROCEDURE — 95251 CONT GLUC MNTR ANALYSIS I&R: CPT | Performed by: STUDENT IN AN ORGANIZED HEALTH CARE EDUCATION/TRAINING PROGRAM

## 2024-08-28 PROCEDURE — 83036 HEMOGLOBIN GLYCOSYLATED A1C: CPT | Performed by: STUDENT IN AN ORGANIZED HEALTH CARE EDUCATION/TRAINING PROGRAM

## 2024-08-28 PROCEDURE — 3048F LDL-C <100 MG/DL: CPT | Performed by: STUDENT IN AN ORGANIZED HEALTH CARE EDUCATION/TRAINING PROGRAM

## 2024-08-28 PROCEDURE — 99214 OFFICE O/P EST MOD 30 MIN: CPT | Performed by: STUDENT IN AN ORGANIZED HEALTH CARE EDUCATION/TRAINING PROGRAM

## 2024-08-28 PROCEDURE — 4010F ACE/ARB THERAPY RXD/TAKEN: CPT | Performed by: STUDENT IN AN ORGANIZED HEALTH CARE EDUCATION/TRAINING PROGRAM

## 2024-08-28 PROCEDURE — 3008F BODY MASS INDEX DOCD: CPT | Performed by: STUDENT IN AN ORGANIZED HEALTH CARE EDUCATION/TRAINING PROGRAM

## 2024-08-28 PROCEDURE — 3080F DIAST BP >= 90 MM HG: CPT | Performed by: STUDENT IN AN ORGANIZED HEALTH CARE EDUCATION/TRAINING PROGRAM

## 2024-08-28 NOTE — PROGRESS NOTES
"Subjective   Patient ID: Good Gee is a 44 y.o. male who presents for Diabetes (Dx dm1: age 25 /PCP:Katja /Podiatry: does not see one /Eye exam: scheduled soon per patient/Patient is has 780G Medtronic pump and G4 cgm- LINKED /Patient is testing his sugars up to 4 times per day. /Due to fluctuating blood glucose, hypoglycemia and insulin pump management. /1/18/24 8.9% hga1c ).    HPI  The patient is a 43 yo male with PMH of DM1 diagnosed at age 25 , presents to Endocrinology for DM follow up.   He is now on closed loop Medtronic 770 G.  automode 86 % , manual 13%  Sensor 84 %   A1c is at  7.3 , 7.7 % 8.9 , was 7.5  TIR 67 % high 24 % , very high 9 %      pump downloads reviewed and will be scanned in EMR  total insulin dose 33 units   basal 55 % , bolus 45 %.    Review of Systems    Objective   Physical Exam  Constitutional:       Appearance: Normal appearance.   Cardiovascular:      Rate and Rhythm: Normal rate and regular rhythm.   Pulmonary:      Effort: Pulmonary effort is normal.      Breath sounds: Normal breath sounds.   Neurological:      General: No focal deficit present.      Mental Status: He is alert.   Psychiatric:         Mood and Affect: Mood normal.    Visit Vitals  /90   Ht 1.803 m (5' 11\")   Wt 92.5 kg (204 lb)   BMI 28.45 kg/m²   Smoking Status Never   BSA 2.15 m²        Assessment/Plan        The patient is a 43 yo male with PMH of DM1 diagnosed at age 25 , presents to Endocrinology for DM follow up. He is on Medtronic 780 G ( was on 770 G )  - Uncontrolled DM1 with Hba1c of  7.3 , 7.7 was  8.9 on Medtronic 780 pump on automode   - complicated by hypoglycemia usually at work , no recent hypoglycemia episodes   -HTN ,DM nephropathy urine albumin imporoving on Lisinopril 40, amlodipine managed by nephrology   - HLD LDL 67 on atorvastatin 40 mg   -DM retinopathy s/p eye injection of eyes and laser treatment. Following with ophthalmology   - SDH previously on Dexamethasone, repeat 3/2021 Ct " head showed improvement in SDH following with NSY.     Plan :  Continue  smart guard, target     change sensitivity  to 35    basal    -4:30 0.85 u/hr      4:30- 9 am 0.75 u/hr     9 am - 00:00 0.85 u/hr      - carb ratio 00:00 1:11    13:00 1:9       RTC in 3-4 months

## 2024-09-17 DIAGNOSIS — E10.319 DIABETIC RETINOPATHY OF BOTH EYES WITHOUT MACULAR EDEMA ASSOCIATED WITH TYPE 1 DIABETES MELLITUS, UNSPECIFIED RETINOPATHY SEVERITY: Primary | ICD-10-CM

## 2024-09-20 ENCOUNTER — TELEPHONE (OUTPATIENT)
Dept: PRIMARY CARE | Facility: CLINIC | Age: 44
End: 2024-09-20
Payer: COMMERCIAL

## 2024-09-20 ENCOUNTER — OFFICE VISIT (OUTPATIENT)
Dept: PRIMARY CARE | Facility: CLINIC | Age: 44
End: 2024-09-20
Payer: COMMERCIAL

## 2024-09-20 VITALS
WEIGHT: 210 LBS | HEART RATE: 53 BPM | DIASTOLIC BLOOD PRESSURE: 63 MMHG | HEIGHT: 71 IN | SYSTOLIC BLOOD PRESSURE: 113 MMHG | TEMPERATURE: 97.6 F | OXYGEN SATURATION: 98 % | BODY MASS INDEX: 29.4 KG/M2

## 2024-09-20 DIAGNOSIS — H61.23 BILATERAL IMPACTED CERUMEN: ICD-10-CM

## 2024-09-20 DIAGNOSIS — S32.030S COMPRESSION FRACTURE OF L3 VERTEBRA, SEQUELA: Primary | ICD-10-CM

## 2024-09-20 PROBLEM — F11.20 CONTINUOUS OPIOID DEPENDENCE (MULTI): Status: ACTIVE | Noted: 2024-03-07

## 2024-09-20 PROBLEM — Z86.79 HISTORY OF HYPERTENSION: Status: ACTIVE | Noted: 2024-09-20

## 2024-09-20 PROBLEM — S22.009A CLOSED FRACTURE OF TRANSVERSE PROCESS OF THORACIC VERTEBRA (MULTI): Status: ACTIVE | Noted: 2019-12-11

## 2024-09-20 LAB
AMPHETAMINES UR QL SCN: NORMAL
BARBITURATES UR QL SCN: NORMAL
BZE UR QL SCN: NORMAL
CANNABINOIDS UR QL SCN: NORMAL
CREAT UR-MCNC: 293.8 MG/DL (ref 20–370)
PCP UR QL SCN: NORMAL

## 2024-09-20 PROCEDURE — 80358 DRUG SCREENING METHADONE: CPT

## 2024-09-20 PROCEDURE — 80368 SEDATIVE HYPNOTICS: CPT

## 2024-09-20 PROCEDURE — 80346 BENZODIAZEPINES1-12: CPT

## 2024-09-20 PROCEDURE — 82570 ASSAY OF URINE CREATININE: CPT

## 2024-09-20 PROCEDURE — 80365 DRUG SCREENING OXYCODONE: CPT

## 2024-09-20 PROCEDURE — RXMED WILLOW AMBULATORY MEDICATION CHARGE

## 2024-09-20 PROCEDURE — 80307 DRUG TEST PRSMV CHEM ANLYZR: CPT

## 2024-09-20 PROCEDURE — 80354 DRUG SCREENING FENTANYL: CPT

## 2024-09-20 PROCEDURE — 80373 DRUG SCREENING TRAMADOL: CPT

## 2024-09-20 PROCEDURE — 80361 OPIATES 1 OR MORE: CPT

## 2024-09-20 ASSESSMENT — ENCOUNTER SYMPTOMS: BACK PAIN: 1

## 2024-09-20 ASSESSMENT — PAIN SCALES - GENERAL: PAINLEVEL: 8

## 2024-09-20 NOTE — PROGRESS NOTES
Subjective   Patient ID:   Good Gee is a 44 y.o. male who presents for Follow-up.  HPI    #Chronic low back pain  - Reports he has been out of Percocet for a couple months  - Reported he missed his appointment due to it being cancelled and was not in town when it was rescheduled  - Reported he has been trying to reschedule but has had difficulty due to travelling back and forth to [place] for work  - Reported he got massages and therapy through work while down in [place] which helped a little  - He has tried regular PT and aquatic PT in the past which he found helpful  - Has been using Tylenol and lidocaine patches and heating pads which helps a little  - Reported he was rationing his remaining Percocet and was cutting them in pieces to save them  - Previously followed with pain management who he reported would like another MRI of his back but is currently waiting for insurance for MRI coverage      OARRS:  No data recorded  I have personally reviewed the OARRS report for Good Gee. I have considered the risks of abuse, dependence, addiction and diversion    Is the patient prescribed a combination of a benzodiazepine and opioid?  No    Last Urine Drug Screen / ordered today: Yes  Recent Results (from the past 8760 hour(s))   Drug Screen, Urine With Reflex to Confirmation    Collection Time: 03/07/24  4:06 PM   Result Value Ref Range    Amphetamine Screen, Urine Presumptive Negative Presumptive Negative    Barbiturate Screen, Urine Presumptive Negative Presumptive Negative    Benzodiazepines Screen, Urine Presumptive Negative Presumptive Negative    Cannabinoid Screen, Urine Presumptive Negative Presumptive Negative    Cocaine Metabolite Screen, Urine Presumptive Negative Presumptive Negative    Fentanyl Screen, Urine Presumptive Negative Presumptive Negative    Opiate Screen, Urine Presumptive Negative Presumptive Negative    Oxycodone Screen, Urine Presumptive Negative Presumptive Negative    PCP Screen,  "Urine Presumptive Negative Presumptive Negative    Methadone Screen, Urine Presumptive Negative Presumptive Negative     Results are as expected.         Controlled Substance Agreement:  Date of the Last Agreement:   Reviewed Controlled Substance Agreement including but not limited to the benefits, risks, and alternatives to treatment with a Controlled Substance medication(s).    Opioids:  What is the patient's goal of therapy? Make pain more tolerable to be able to do his daily activities  Is this being achieved with current treatment? 3/7/2024    I have calculated the patient's Morphine Dose Equivalent (MED):   I have considered referral to Pain Management and/or a specialist, and do not feel it is necessary at this time.    I feel that it is clinically indicated to continue this current medication regimen after consideration of alternative therapies, and other non-opioid treatment.    Opioid Risk Screening:  No data recorded    Pain Assessment:  No data recorded    #Muffled hearing in left ear  - Reported that he has noticed that he has muffled hearing in his left ear.    Review of Systems   Musculoskeletal:  Positive for back pain.       Objective   /63 (BP Location: Right arm, Patient Position: Sitting, BP Cuff Size: Adult)   Pulse 53   Temp 36.4 °C (97.6 °F) (Temporal)   Ht 1.803 m (5' 11\")   Wt 95.3 kg (210 lb)   SpO2 98%   BMI 29.29 kg/m²    Physical Exam  Constitutional:       Appearance: Normal appearance.   HENT:      Head: Normocephalic and atraumatic.      Right Ear: Tympanic membrane, ear canal and external ear normal. There is impacted cerumen.      Left Ear: Ear canal and external ear normal. There is impacted cerumen.      Ears:      Comments: Left ear was completely impacted and right ear was 50% impacted  Eyes:      Extraocular Movements: Extraocular movements intact.   Cardiovascular:      Rate and Rhythm: Normal rate and regular rhythm.      Heart sounds: Normal heart sounds. No " murmur heard.     No friction rub. No gallop.   Pulmonary:      Effort: Pulmonary effort is normal. No respiratory distress.      Breath sounds: Normal breath sounds. No stridor. No wheezing, rhonchi or rales.   Musculoskeletal:         General: Normal range of motion.   Neurological:      General: No focal deficit present.      Mental Status: He is alert.   Psychiatric:         Mood and Affect: Mood normal.         Behavior: Behavior normal.         Assessment/Plan     Problem List Items Addressed This Visit    None  Good Gee is a 44 year old male who presents to the clinic for med refill and follow up.    #Chronic low back pain 2/2 to L3 compression fracture  - OARRS reviewed and appropriate  - Ordered UDS, will plan to refill Percocet 5-325 mg BID PRN for pain if appropriate  - Previously discussed tapering Percocet which patient is agreeable with. Plan to decrease from a 25 day to 20 day supply at next visit  - Discussed that he should follow up in 2 months instead of 3 to avoid running out of his medication if appointment is cancelled/missed  - Referral to integrative medicine placed  - Referral to PT placed  - Last saw Pain medicine in 6/2022, New referral placed    #B/L Cerumen impaction  - Ordered Debrox otic solution     RTC in 2 months for a follow up.       The patient was discussed with Dr. Taylor.    Cosme Hightower MD  Family Medicine   PGY-3

## 2024-09-20 NOTE — TELEPHONE ENCOUNTER
Nurse spoke with provider. Provider confirmed pt can come in today to be seen sooner.Call placed to pt to make aware. Pt confirms coming to see provider today.

## 2024-09-20 NOTE — TELEPHONE ENCOUNTER
Noted communication via provider stating pt can be double booked, if sooner appt need for pain med refill. Call placed to pt to attempt scheduling sooner. No feasible appts for pt available. Nurse informed she would communicate with provider r/t pt possibly being seen today in place of a No Show, and then nurse to F/U with pt.

## 2024-09-23 DIAGNOSIS — E55.9 VITAMIN D DEFICIENCY: ICD-10-CM

## 2024-09-24 DIAGNOSIS — S32.030S COMPRESSION FRACTURE OF L3 VERTEBRA, SEQUELA: Primary | ICD-10-CM

## 2024-09-24 DIAGNOSIS — G89.4 CHRONIC PAIN DISORDER: ICD-10-CM

## 2024-09-24 PROCEDURE — RXMED WILLOW AMBULATORY MEDICATION CHARGE

## 2024-09-24 RX ORDER — OXYCODONE AND ACETAMINOPHEN 5; 325 MG/1; MG/1
1 TABLET ORAL 2 TIMES DAILY PRN
Qty: 50 TABLET | Refills: 0 | Status: SHIPPED | OUTPATIENT
Start: 2024-11-15 | End: 2024-12-10

## 2024-09-24 RX ORDER — OXYCODONE AND ACETAMINOPHEN 5; 325 MG/1; MG/1
1 TABLET ORAL 2 TIMES DAILY PRN
Qty: 50 TABLET | Refills: 0 | Status: SHIPPED | OUTPATIENT
Start: 2024-09-24 | End: 2024-10-21

## 2024-09-24 RX ORDER — ERGOCALCIFEROL 1.25 MG/1
1 CAPSULE ORAL
Qty: 12 CAPSULE | Refills: 2 | Status: SHIPPED | OUTPATIENT
Start: 2024-09-29 | End: 2025-09-29

## 2024-09-24 RX ORDER — OXYCODONE AND ACETAMINOPHEN 5; 325 MG/1; MG/1
1 TABLET ORAL 2 TIMES DAILY PRN
Qty: 50 TABLET | Refills: 0 | Status: SHIPPED | OUTPATIENT
Start: 2024-10-20 | End: 2024-11-14

## 2024-09-26 ENCOUNTER — PHARMACY VISIT (OUTPATIENT)
Dept: PHARMACY | Facility: CLINIC | Age: 44
End: 2024-09-26
Payer: MEDICAID

## 2024-09-27 NOTE — PROGRESS NOTES
I reviewed the resident/fellow's documentation and discussed the patient with the resident/fellow. I agree with the resident/fellow's medical decision making as documented in the note.    Tiffany Taylor MD

## 2024-10-01 ENCOUNTER — APPOINTMENT (OUTPATIENT)
Dept: PRIMARY CARE | Facility: CLINIC | Age: 44
End: 2024-10-01
Payer: COMMERCIAL

## 2024-11-03 PROCEDURE — RXMED WILLOW AMBULATORY MEDICATION CHARGE

## 2024-11-04 ENCOUNTER — PHARMACY VISIT (OUTPATIENT)
Dept: PHARMACY | Facility: CLINIC | Age: 44
End: 2024-11-04
Payer: MEDICAID

## 2024-11-19 ENCOUNTER — APPOINTMENT (OUTPATIENT)
Dept: PRIMARY CARE | Facility: CLINIC | Age: 44
End: 2024-11-19
Payer: COMMERCIAL

## 2024-12-04 ENCOUNTER — APPOINTMENT (OUTPATIENT)
Dept: ENDOCRINOLOGY | Facility: CLINIC | Age: 44
End: 2024-12-04
Payer: COMMERCIAL

## 2024-12-08 PROCEDURE — RXMED WILLOW AMBULATORY MEDICATION CHARGE

## 2024-12-10 ENCOUNTER — PHARMACY VISIT (OUTPATIENT)
Dept: PHARMACY | Facility: CLINIC | Age: 44
End: 2024-12-10
Payer: MEDICAID

## 2025-01-03 ENCOUNTER — TELEPHONE (OUTPATIENT)
Dept: ENDOCRINOLOGY | Facility: CLINIC | Age: 45
End: 2025-01-03
Payer: COMMERCIAL

## 2025-01-03 NOTE — TELEPHONE ENCOUNTER
Good left a voice mail 1-3-25.  He said his medical supply co. Quit on him   And needs some information.  Please call him to discuss supplies.  [phone].  Thank you

## 2025-01-16 ENCOUNTER — APPOINTMENT (OUTPATIENT)
Dept: ENDOCRINOLOGY | Facility: CLINIC | Age: 45
End: 2025-01-16
Payer: COMMERCIAL

## 2025-01-16 VITALS
HEIGHT: 71 IN | SYSTOLIC BLOOD PRESSURE: 150 MMHG | WEIGHT: 191 LBS | BODY MASS INDEX: 26.74 KG/M2 | DIASTOLIC BLOOD PRESSURE: 70 MMHG

## 2025-01-16 DIAGNOSIS — Z96.41 INSULIN PUMP IN PLACE: Primary | ICD-10-CM

## 2025-01-16 DIAGNOSIS — N18.32 STAGE 3B CHRONIC KIDNEY DISEASE (MULTI): ICD-10-CM

## 2025-01-16 DIAGNOSIS — Z97.8 USES SELF-APPLIED CONTINUOUS GLUCOSE MONITORING DEVICE: ICD-10-CM

## 2025-01-16 DIAGNOSIS — E10.641 UNCONTROLLED TYPE 1 DIABETES MELLITUS WITH HYPOGLYCEMIA AND COMA: ICD-10-CM

## 2025-01-16 DIAGNOSIS — E10.65 UNCONTROLLED TYPE 1 DIABETES MELLITUS WITH HYPERGLYCEMIA: ICD-10-CM

## 2025-01-16 LAB
POC FINGERSTICK BLOOD GLUCOSE: 187 MG/DL (ref 70–100)
POC HEMOGLOBIN A1C: 7.4 % (ref 4.2–6.5)

## 2025-01-16 PROCEDURE — RXMED WILLOW AMBULATORY MEDICATION CHARGE

## 2025-01-16 PROCEDURE — 82962 GLUCOSE BLOOD TEST: CPT | Performed by: STUDENT IN AN ORGANIZED HEALTH CARE EDUCATION/TRAINING PROGRAM

## 2025-01-16 PROCEDURE — 3008F BODY MASS INDEX DOCD: CPT | Performed by: STUDENT IN AN ORGANIZED HEALTH CARE EDUCATION/TRAINING PROGRAM

## 2025-01-16 PROCEDURE — 99214 OFFICE O/P EST MOD 30 MIN: CPT | Performed by: STUDENT IN AN ORGANIZED HEALTH CARE EDUCATION/TRAINING PROGRAM

## 2025-01-16 PROCEDURE — 3078F DIAST BP <80 MM HG: CPT | Performed by: STUDENT IN AN ORGANIZED HEALTH CARE EDUCATION/TRAINING PROGRAM

## 2025-01-16 PROCEDURE — 4010F ACE/ARB THERAPY RXD/TAKEN: CPT | Performed by: STUDENT IN AN ORGANIZED HEALTH CARE EDUCATION/TRAINING PROGRAM

## 2025-01-16 PROCEDURE — 3077F SYST BP >= 140 MM HG: CPT | Performed by: STUDENT IN AN ORGANIZED HEALTH CARE EDUCATION/TRAINING PROGRAM

## 2025-01-16 PROCEDURE — 83036 HEMOGLOBIN GLYCOSYLATED A1C: CPT | Performed by: STUDENT IN AN ORGANIZED HEALTH CARE EDUCATION/TRAINING PROGRAM

## 2025-01-16 PROCEDURE — 95251 CONT GLUC MNTR ANALYSIS I&R: CPT | Performed by: STUDENT IN AN ORGANIZED HEALTH CARE EDUCATION/TRAINING PROGRAM

## 2025-01-16 RX ORDER — INSULIN LISPRO 100 [IU]/ML
INJECTION, SOLUTION INTRAVENOUS; SUBCUTANEOUS
Qty: 60 ML | Refills: 3 | Status: SHIPPED | OUTPATIENT
Start: 2025-01-16

## 2025-01-16 NOTE — PROGRESS NOTES
"Subjective   Patient ID: Good Gee is a 44 y.o. male who presents for Diabetes (Good Gee is a 44 y.o. male who presents for Diabetes (Dx dm1: age 25 /PCP:Katja /Podiatry: does not see one /Eye exam: scheduled soon per patient/Patient is has 780G Medtronic pump and G4 cgm- LINKED /Patient is testing his sugars up to 4 times per day. /Due to fluctuating blood glucose, hypoglycemia and insulin pump management. ).   Lab Results   Component Value Date    HGBA1C 7.4 (A) 01/16/2025      HPI    The patient is a 43 yo male with PMH of DM1 diagnosed at age 25 , presents to Endocrinology for DM follow up.   He is now on closed loop Medtronic 780 G.  automode 45 % , manual 51 % ( had sensor issues , sensor has not worked x 2  )  Sensor 45 %     A1c is at  7.4 , 7.3 , 7.7 % 8.9 , was 7.5  TIR 73 % high 24 % , very high 3 %      pump downloads reviewed and will be scanned in EMR  total insulin dose 33 units   basal 55 % , bolus 45 %.  Review of Systems    Objective   Physical Exam  Constitutional:       Appearance: Normal appearance.   Cardiovascular:      Rate and Rhythm: Normal rate and regular rhythm.   Pulmonary:      Effort: Pulmonary effort is normal.      Breath sounds: Normal breath sounds.   Neurological:      General: No focal deficit present.      Mental Status: He is alert.      Visit Vitals  /70   Ht 1.803 m (5' 11\")   Wt 86.6 kg (191 lb)   BMI 26.64 kg/m²   Smoking Status Never   BSA 2.08 m²        Assessment/Plan          The patient is a 43 yo male with PMH of DM1 diagnosed at age 25 , presents to Endocrinology for DM follow up. He is on Medtronic 780 G ( was on 770 G )  - Relatively controlled DM1 with Hba1c of  7.4 , 7.3  on Medtronic 780 pump on automode   - complicated by hypoglycemia usually at work , no recent hypoglycemia episodes   -HTN ,DM nephropathy urine albumin imporoving on Lisinopril 40, amlodipine managed by nephrology   - HLD LDL 67 on atorvastatin 40 mg   -DM retinopathy s/p eye " injection of eyes and laser treatment. Following with ophthalmology   - SDH previously on Dexamethasone, repeat 3/2021 Ct head showed improvement in SDH following with NSY.     Plan :  Continue  smart guard, target     sensitivity 35     basal    -4:30 0.85 u/hr      4:30- 9 am 0.75 u/hr     9 am - 00:00 0.85 u/hr      - carb ratio 00:00 1:11    13:00 1:9        Fu in 3 months with Endocrinologist [place] ( office contact of Dr. Quintin mendez)

## 2025-01-21 ENCOUNTER — PHARMACY VISIT (OUTPATIENT)
Dept: PHARMACY | Facility: CLINIC | Age: 45
End: 2025-01-21
Payer: MEDICAID

## 2025-02-07 ENCOUNTER — APPOINTMENT (OUTPATIENT)
Dept: PRIMARY CARE | Facility: CLINIC | Age: 45
End: 2025-02-07
Payer: COMMERCIAL

## 2025-02-07 ENCOUNTER — PHARMACY VISIT (OUTPATIENT)
Dept: PHARMACY | Facility: CLINIC | Age: 45
End: 2025-02-07
Payer: MEDICAID

## 2025-02-07 VITALS
SYSTOLIC BLOOD PRESSURE: 126 MMHG | TEMPERATURE: 97.6 F | DIASTOLIC BLOOD PRESSURE: 79 MMHG | HEIGHT: 71 IN | BODY MASS INDEX: 26.77 KG/M2 | OXYGEN SATURATION: 100 % | WEIGHT: 191.2 LBS | HEART RATE: 55 BPM

## 2025-02-07 DIAGNOSIS — S32.030S COMPRESSION FRACTURE OF L3 VERTEBRA, SEQUELA: ICD-10-CM

## 2025-02-07 DIAGNOSIS — G89.21 CHRONIC PAIN AFTER TRAUMATIC INJURY: ICD-10-CM

## 2025-02-07 DIAGNOSIS — T78.40XD ALLERGY, SUBSEQUENT ENCOUNTER: ICD-10-CM

## 2025-02-07 DIAGNOSIS — G89.4 CHRONIC PAIN DISORDER: Primary | ICD-10-CM

## 2025-02-07 PROCEDURE — 99214 OFFICE O/P EST MOD 30 MIN: CPT

## 2025-02-07 PROCEDURE — 3074F SYST BP LT 130 MM HG: CPT

## 2025-02-07 PROCEDURE — 3078F DIAST BP <80 MM HG: CPT

## 2025-02-07 PROCEDURE — 99214 OFFICE O/P EST MOD 30 MIN: CPT | Mod: GC

## 2025-02-07 PROCEDURE — 1036F TOBACCO NON-USER: CPT

## 2025-02-07 PROCEDURE — 3008F BODY MASS INDEX DOCD: CPT

## 2025-02-07 PROCEDURE — 4010F ACE/ARB THERAPY RXD/TAKEN: CPT

## 2025-02-07 PROCEDURE — RXMED WILLOW AMBULATORY MEDICATION CHARGE

## 2025-02-07 PROCEDURE — G2211 COMPLEX E/M VISIT ADD ON: HCPCS

## 2025-02-07 RX ORDER — OXYCODONE AND ACETAMINOPHEN 5; 325 MG/1; MG/1
1 TABLET ORAL EVERY 6 HOURS PRN
Qty: 50 TABLET | Refills: 0 | Status: SHIPPED | OUTPATIENT
Start: 2025-03-09 | End: 2025-04-08

## 2025-02-07 RX ORDER — OXYCODONE AND ACETAMINOPHEN 5; 325 MG/1; MG/1
1 TABLET ORAL EVERY 6 HOURS PRN
Qty: 50 TABLET | Refills: 0 | Status: SHIPPED | OUTPATIENT
Start: 2025-04-08 | End: 2025-05-08

## 2025-02-07 RX ORDER — CETIRIZINE HYDROCHLORIDE 10 MG/1
10 TABLET ORAL DAILY
Qty: 90 TABLET | Refills: 3 | Status: SHIPPED | OUTPATIENT
Start: 2025-02-07

## 2025-02-07 RX ORDER — OXYCODONE AND ACETAMINOPHEN 5; 325 MG/1; MG/1
1 TABLET ORAL 2 TIMES DAILY PRN
Qty: 50 TABLET | Refills: 0 | Status: SHIPPED | OUTPATIENT
Start: 2025-02-07 | End: 2025-03-09

## 2025-02-07 ASSESSMENT — PATIENT HEALTH QUESTIONNAIRE - PHQ9
SUM OF ALL RESPONSES TO PHQ9 QUESTIONS 1 AND 2: 0
1. LITTLE INTEREST OR PLEASURE IN DOING THINGS: NOT AT ALL
2. FEELING DOWN, DEPRESSED OR HOPELESS: NOT AT ALL

## 2025-02-07 ASSESSMENT — ENCOUNTER SYMPTOMS: DEPRESSION: 0

## 2025-02-07 ASSESSMENT — PAIN SCALES - GENERAL: PAINLEVEL_OUTOF10: 7

## 2025-02-07 NOTE — PROGRESS NOTES
Subjective   Patient ID:   Good Gee is a 44 y.o. male who presents for No chief complaint on file..  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 I have considered the risks of abuse, dependence, addiction and diversion    Is the patient prescribed a combination of a benzodiazepine and opioid?  No    Last Urine Drug Screen / ordered today: No  Recent Results (from the past 8760 hours)   Confirmation Opiate/Opioid/Benzo Prescription Compliance    Collection Time: 09/20/24  4:50 PM   Result Value Ref Range    Clonazepam <25 <25 ng/mL    7-Aminoclonazepam <25 <25 ng/mL    Alprazolam <25 <25 ng/mL    Alpha-Hydroxyalprazolam <25 <25 ng/mL    Midazolam <25 <25 ng/mL    Alpha-Hydroxymidazolam <25 <25 ng/mL    Chlordiazepoxide <25 <25 ng/mL    Diazepam <25 <25 ng/mL    Nordiazepam <25 <25 ng/mL    Temazepam <25 <25 ng/mL    Oxazepam <25 <25 ng/mL    Lorazepam <25 <25 ng/mL    Methadone <25 <25 ng/mL    EDDP <25 <25 ng/mL    6-Acetylmorphine <25 <25 ng/mL    Codeine <50 <50 ng/mL    Hydrocodone <25 <25 ng/mL    Hydromorphone <25 <25 ng/mL    Morphine  <50 <50 ng/mL    Norhydrocodone <25 <25 ng/mL    Noroxycodone <25 <25 ng/mL    Oxycodone <25 <25 ng/mL    Oxymorphone <25 <25 ng/mL    Fentanyl <2.5 <2.5 ng/mL    Norfentanyl <2.5 <2.5 ng/mL    Tramadol <50 <50 ng/mL    O-Desmethyltramadol <50 <50 ng/mL    Zolpidem <25 <25 ng/mL    Zolpidem Metabolite (ZCA) <25 <25 ng/mL   Screen Opiate/Opioid/Benzo Prescription Compliance    Collection Time: 09/20/24  4:50 PM    Result Value Ref Range    Creatinine, Urine Random 293.8 20.0 - 370.0 mg/dL    Amphetamine Screen, Urine Presumptive Negative Presumptive Negative    Barbiturate Screen, Urine Presumptive Negative Presumptive Negative    Cannabinoid Screen, Urine Presumptive Negative Presumptive Negative    Cocaine Metabolite Screen, Urine Presumptive Negative Presumptive Negative    PCP Screen, Urine Presumptive Negative Presumptive Negative   Drug Screen, Urine With Reflex to Confirmation    Collection Time: 03/07/24  4:06 PM   Result Value Ref Range    Amphetamine Screen, Urine Presumptive Negative Presumptive Negative    Barbiturate Screen, Urine Presumptive Negative Presumptive Negative    Benzodiazepines Screen, Urine Presumptive Negative Presumptive Negative    Cannabinoid Screen, Urine Presumptive Negative Presumptive Negative    Cocaine Metabolite Screen, Urine Presumptive Negative Presumptive Negative    Fentanyl Screen, Urine Presumptive Negative Presumptive Negative    Opiate Screen, Urine Presumptive Negative Presumptive Negative    Oxycodone Screen, Urine Presumptive Negative Presumptive Negative    PCP Screen, Urine Presumptive Negative Presumptive Negative    Methadone Screen, Urine Presumptive Negative Presumptive Negative     N/A    Controlled Substance Agreement:  Date of the Last Agreement: 3/7/2024  Reviewed Controlled Substance Agreement including but not limited to the benefits, risks, and alternatives to treatment with a Controlled Substance medication(s).    Opioids:  What is the patient's goal of therapy? Make pain more tolerable and allow him to do his work and chores  Is this being achieved with current treatment? Yes    I have calculated the patient's Morphine Dose Equivalent (MED):   I have considered referral to Pain Management and/or a specialist, and do not feel it is necessary at this time.    I feel that it is clinically indicated to continue this current medication regimen after consideration of  alternative therapies, and other non-opioid treatment.    Opioid Risk Screening:  No data recorded    Pain Assessment:  No data recorded    #Allergies  - Requested refill on [facility]    Review of Systems    Objective   There were no vitals taken for this visit.   Physical Exam  Constitutional:       General: He is not in acute distress.     Appearance: Normal appearance. He is not ill-appearing.   HENT:      Head: Normocephalic and atraumatic.   Eyes:      Extraocular Movements: Extraocular movements intact.      Conjunctiva/sclera: Conjunctivae normal.   Cardiovascular:      Rate and Rhythm: Normal rate and regular rhythm.      Heart sounds: Normal heart sounds. No murmur heard.     No friction rub. No gallop.   Pulmonary:      Effort: Pulmonary effort is normal. No respiratory distress.      Breath sounds: Normal breath sounds. No wheezing.   Musculoskeletal:         General: Normal range of motion.      Cervical back: Normal range of motion.   Neurological:      General: No focal deficit present.      Mental Status: He is alert.   Psychiatric:         Mood and Affect: Mood normal.         Behavior: Behavior normal.       Assessment/Plan     Problem List Items Addressed This Visit    None     Good Gee is a 44 year old male who presents to the clinic for med refill and follow up.     #Chronic low back pain 2/2 to L3 compression fracture  - OARRS reviewed and appropriate  - Will plan to order a UDS at next visit  - Overall plan is to wean off Percocet but will keep medication number the same for now (50 tablets over 30 days)  - Discussed that he should follow up in 2 months instead of 3 to avoid running out of his medication if appointment is cancelled/missed  - Referral to integrative medicine placed for yoga therapy  - c/w PT, Tylenol, Lidocaine patches, massages, and Icy hot as needed  - Last saw Pain medicine in 6/2022 and a referral was placed at last visit, Encouraged to schedule follow up appointment  -  Refilled Percocet 325-5 mg BID PRN    #Allergies  - Refilled Zyrtec 10 mg every day PRN     RTC in 2 months for a HM visit.       The patient was discussed with Dr. Ortega.    Cosme Hightower MD  Family Medicine   PGY-3

## 2025-02-28 ENCOUNTER — APPOINTMENT (OUTPATIENT)
Dept: ENDOCRINOLOGY | Facility: CLINIC | Age: 45
End: 2025-02-28
Payer: COMMERCIAL

## 2025-02-28 VITALS
HEIGHT: 71 IN | DIASTOLIC BLOOD PRESSURE: 98 MMHG | WEIGHT: 192 LBS | BODY MASS INDEX: 26.88 KG/M2 | SYSTOLIC BLOOD PRESSURE: 157 MMHG | HEART RATE: 55 BPM

## 2025-02-28 DIAGNOSIS — E10.65 TYPE 1 DIABETES MELLITUS WITH HYPERGLYCEMIA (MULTI): Primary | ICD-10-CM

## 2025-02-28 DIAGNOSIS — E78.5 HYPERLIPIDEMIA, UNSPECIFIED HYPERLIPIDEMIA TYPE: ICD-10-CM

## 2025-02-28 DIAGNOSIS — E10.641 UNCONTROLLED TYPE 1 DIABETES MELLITUS WITH HYPOGLYCEMIA AND COMA: ICD-10-CM

## 2025-02-28 DIAGNOSIS — E10.65 UNCONTROLLED TYPE 1 DIABETES MELLITUS WITH HYPERGLYCEMIA: ICD-10-CM

## 2025-02-28 PROCEDURE — 4010F ACE/ARB THERAPY RXD/TAKEN: CPT | Performed by: STUDENT IN AN ORGANIZED HEALTH CARE EDUCATION/TRAINING PROGRAM

## 2025-02-28 PROCEDURE — 99204 OFFICE O/P NEW MOD 45 MIN: CPT | Performed by: STUDENT IN AN ORGANIZED HEALTH CARE EDUCATION/TRAINING PROGRAM

## 2025-02-28 PROCEDURE — 3080F DIAST BP >= 90 MM HG: CPT | Performed by: STUDENT IN AN ORGANIZED HEALTH CARE EDUCATION/TRAINING PROGRAM

## 2025-02-28 PROCEDURE — 3077F SYST BP >= 140 MM HG: CPT | Performed by: STUDENT IN AN ORGANIZED HEALTH CARE EDUCATION/TRAINING PROGRAM

## 2025-02-28 PROCEDURE — 3008F BODY MASS INDEX DOCD: CPT | Performed by: STUDENT IN AN ORGANIZED HEALTH CARE EDUCATION/TRAINING PROGRAM

## 2025-02-28 RX ORDER — BLOOD SUGAR DIAGNOSTIC
STRIP MISCELLANEOUS
Qty: 200 STRIP | Refills: 3 | Status: SHIPPED | OUTPATIENT
Start: 2025-02-28
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
## 2025-02-28 NOTE — PATIENT INSTRUCTIONS
Labs in the summer     Follow up end of summer    Shelbie Tariq MD  Divison of Endocrinology   [facility]   Phone: [phone]    option 4, then option 1  Fax: [phone]

## 2025-03-09 DIAGNOSIS — G89.21 CHRONIC PAIN AFTER TRAUMATIC INJURY: ICD-10-CM

## 2025-03-09 DIAGNOSIS — G89.4 CHRONIC PAIN DISORDER: ICD-10-CM

## 2025-03-09 DIAGNOSIS — S32.030S COMPRESSION FRACTURE OF L3 VERTEBRA, SEQUELA: ICD-10-CM

## 2025-03-09 PROCEDURE — RXMED WILLOW AMBULATORY MEDICATION CHARGE

## 2025-03-10 RX ORDER — OXYCODONE AND ACETAMINOPHEN 5; 325 MG/1; MG/1
1 TABLET ORAL 2 TIMES DAILY PRN
Qty: 50 TABLET | Refills: 0 | OUTPATIENT
Start: 2025-03-10 | End: 2025-04-09

## 2025-03-10 NOTE — TELEPHONE ENCOUNTER
This patient has 2 months worth of refills and his next refill was ready to be picked up starting 3/9

## 2025-03-12 PROCEDURE — RXMED WILLOW AMBULATORY MEDICATION CHARGE

## 2025-03-16 ENCOUNTER — PHARMACY VISIT (OUTPATIENT)
Dept: PHARMACY | Facility: CLINIC | Age: 45
End: 2025-03-16
Payer: MEDICAID

## 2025-04-08 PROCEDURE — RXMED WILLOW AMBULATORY MEDICATION CHARGE

## 2025-04-11 DIAGNOSIS — I10 BENIGN ESSENTIAL HTN: ICD-10-CM

## 2025-04-11 DIAGNOSIS — E78.2 MODERATE MIXED HYPERLIPIDEMIA NOT REQUIRING STATIN THERAPY: ICD-10-CM

## 2025-04-11 PROCEDURE — RXMED WILLOW AMBULATORY MEDICATION CHARGE

## 2025-04-11 RX ORDER — CHLORTHALIDONE 50 MG/1
50 TABLET ORAL DAILY
Qty: 90 TABLET | Refills: 3 | Status: SHIPPED | OUTPATIENT
Start: 2025-04-11

## 2025-04-11 RX ORDER — ATORVASTATIN CALCIUM 40 MG/1
40 TABLET, FILM COATED ORAL NIGHTLY
Qty: 90 TABLET | Refills: 3 | Status: SHIPPED | OUTPATIENT
Start: 2025-04-11

## 2025-04-11 RX ORDER — AMLODIPINE BESYLATE 10 MG/1
10 TABLET ORAL DAILY
Qty: 90 TABLET | Refills: 3 | Status: SHIPPED | OUTPATIENT
Start: 2025-04-11

## 2025-04-13 ENCOUNTER — PHARMACY VISIT (OUTPATIENT)
Dept: PHARMACY | Facility: CLINIC | Age: 45
End: 2025-04-13
Payer: MEDICAID

## 2025-04-18 ENCOUNTER — OFFICE VISIT (OUTPATIENT)
Facility: HOSPITAL | Age: 45
End: 2025-04-18
Payer: COMMERCIAL

## 2025-04-18 VITALS
BODY MASS INDEX: 27.3 KG/M2 | DIASTOLIC BLOOD PRESSURE: 89 MMHG | HEIGHT: 71 IN | SYSTOLIC BLOOD PRESSURE: 145 MMHG | WEIGHT: 195 LBS | OXYGEN SATURATION: 98 % | TEMPERATURE: 97 F | HEART RATE: 64 BPM

## 2025-04-18 DIAGNOSIS — H61.23 BILATERAL IMPACTED CERUMEN: ICD-10-CM

## 2025-04-18 DIAGNOSIS — Z12.11 COLON CANCER SCREENING: ICD-10-CM

## 2025-04-18 DIAGNOSIS — S32.009S CLOSED FRACTURE OF LUMBAR VERTEBRA, UNSPECIFIED FRACTURE MORPHOLOGY, UNSPECIFIED LUMBAR VERTEBRAL LEVEL, SEQUELA: ICD-10-CM

## 2025-04-18 DIAGNOSIS — Z00.00 HEALTHCARE MAINTENANCE: Primary | ICD-10-CM

## 2025-04-18 DIAGNOSIS — G89.21 CHRONIC PAIN AFTER TRAUMATIC INJURY: ICD-10-CM

## 2025-04-18 DIAGNOSIS — Z11.3 ROUTINE SCREENING FOR STI (SEXUALLY TRANSMITTED INFECTION): ICD-10-CM

## 2025-04-18 DIAGNOSIS — G89.4 CHRONIC PAIN DISORDER: ICD-10-CM

## 2025-04-18 PROBLEM — E11.319 TYPE 2 DIABETES MELLITUS WITH RETINOPATHY OF BOTH EYES: Status: ACTIVE | Noted: 2020-07-16

## 2025-04-18 PROBLEM — H43.11 VITREOUS HEMORRHAGE OF RIGHT EYE (MULTI): Chronic | Status: ACTIVE | Noted: 2020-07-16

## 2025-04-18 PROBLEM — I10 HYPERTENSION: Chronic | Status: ACTIVE | Noted: 2023-06-15

## 2025-04-18 PROBLEM — H35.20 NONDIABETIC PROLIFERATIVE RETINOPATHY: Status: ACTIVE | Noted: 2025-04-18

## 2025-04-18 ASSESSMENT — PAIN SCALES - GENERAL: PAINLEVEL_OUTOF10: 0-NO PAIN

## 2025-04-18 NOTE — PROGRESS NOTES
Subjective   Patient ID: Good Gee is a 45 y.o. male with PMH of T1DM, chronic low back pain, CKD who presents for Follow-up.    # Health Maintenance  - Last prior HM: a year ago  - Patient's rating of their own health: Fair  - Dental Care: last prior dental visit 2 years ago // brushes teeth every day// flosses teeth  no // denies current tooth pain  - Vision: last prior ophtho visit a year ago // corrective devices: glasses // recent vision: 3 months ago  - Hearing: denies recent hearing loss  - Diet: Appetite has been good, diet is well balanced  - Exercise: going to a gym   - Weight: stable,   - Smoking: never a smoker  - EtOH: Alcohol Use: Yes, patient drinks alcohol. Very rarely  - Illicit substances: denied.  - Employment: Works for [employer]  - Living Situation: Lives with his son and feels safe  - Colon CA: last prior screening N/A // family h/o colon ca? No    MEN  - Currently sexually active with female partner   - Would like to be screened for STIs    HPI    OARRS:  No data recorded  I have personally reviewed the OARRS report for Good Gee. I have considered the risks of abuse, dependence, addiction and diversion    Is the patient prescribed a combination of a benzodiazepine and opioid?  No    Last Urine Drug Screen / ordered today: Yes  Recent Results (from the past 8760 hours)   Confirmation Opiate/Opioid/Benzo Prescription Compliance    Collection Time: 09/20/24  4:50 PM   Result Value Ref Range    Clonazepam <25 <25 ng/mL    7-Aminoclonazepam <25 <25 ng/mL    Alprazolam <25 <25 ng/mL    Alpha-Hydroxyalprazolam <25 <25 ng/mL    Midazolam <25 <25 ng/mL    Alpha-Hydroxymidazolam <25 <25 ng/mL    Chlordiazepoxide <25 <25 ng/mL    Diazepam <25 <25 ng/mL    Nordiazepam <25 <25 ng/mL    Temazepam <25 <25 ng/mL    Oxazepam <25 <25 ng/mL    Lorazepam <25 <25 ng/mL    Methadone <25 <25 ng/mL    EDDP <25 <25 ng/mL    6-Acetylmorphine <25 <25 ng/mL    Codeine <50 <50 ng/mL    Hydrocodone <25  <25 ng/mL    Hydromorphone <25 <25 ng/mL    Morphine  <50 <50 ng/mL    Norhydrocodone <25 <25 ng/mL    Noroxycodone <25 <25 ng/mL    Oxycodone <25 <25 ng/mL    Oxymorphone <25 <25 ng/mL    Fentanyl <2.5 <2.5 ng/mL    Norfentanyl <2.5 <2.5 ng/mL    Tramadol <50 <50 ng/mL    O-Desmethyltramadol <50 <50 ng/mL    Zolpidem <25 <25 ng/mL    Zolpidem Metabolite (ZCA) <25 <25 ng/mL   Screen Opiate/Opioid/Benzo Prescription Compliance    Collection Time: 09/20/24  4:50 PM   Result Value Ref Range    Creatinine, Urine Random 293.8 20.0 - 370.0 mg/dL    Amphetamine Screen, Urine Presumptive Negative Presumptive Negative    Barbiturate Screen, Urine Presumptive Negative Presumptive Negative    Cannabinoid Screen, Urine Presumptive Negative Presumptive Negative    Cocaine Metabolite Screen, Urine Presumptive Negative Presumptive Negative    PCP Screen, Urine Presumptive Negative Presumptive Negative     N/A    Controlled Substance Agreement:  Date of the Last Agreement: 3/7/2024  Reviewed Controlled Substance Agreement including but not limited to the benefits, risks, and alternatives to treatment with a Controlled Substance medication(s).    Opioids:  What is the patient's goal of therapy? Make pain more tolerable and allow him to do his work and chores  Is this being achieved with current treatment? Yes    I have calculated the patient's Morphine Dose Equivalent (MED):   I have considered referral to Pain Management and/or a specialist, and do not feel it is necessary at this time.    I feel that it is clinically indicated to continue this current medication regimen after consideration of alternative therapies, and other non-opioid treatment.    Opioid Risk Screening:  No data recorded    Pain Assessment:  No data recorded    Current Outpatient Medications   Medication Instructions    acetaminophen (Tylenol) 500 mg tablet take 2 tablets BY by mouth every 6 hours if needed    amLODIPine (NORVASC) 10 mg, oral, Daily     "atorvastatin (LIPITOR) 40 mg, oral, Nightly    blood sugar diagnostic (Accu-Chek Guide test strips) strip TEST 4 TIMES DAILY for CGM Guardian calibration    calcitriol (ROCALTROL) 0.25 mcg, oral, Every other day    cetirizine (ZYRTEC) 10 mg, oral, Daily, TAKE 1 TABLET Daily as needed for  seasonal allergies    chlorthalidone (HYGROTON) 50 mg, oral, Daily    fluticasone (Flonase) 50 mcg/actuation nasal spray 1 spray, 2 times daily    glucagon (GLUCAGEN) 1 mg, subcutaneous, Once as needed    glucagon 1 mg injection USE AS DIRECTED.    hydrALAZINE (APRESOLINE) 25 mg, oral, 3 times daily    hydrOXYzine HCL (ATARAX) 25 mg, Every 4 hours PRN    insulin lispro 100 unit/mL injection INJECT 60 UNITS UNDER THE SKIN ONCE DAILY VIA INSULIN PUMP.    lancing device/lancets (ACCU-CHEK FASTCLIX LANCING DEV MISC) Patient is to test up to 5 times daily as needed    lidocaine (Lidoderm) 5 % patch 1 patch, transdermal, Daily, APPLY 1 PATCH TO THE AFFECTED AREA AND LEAVE IN PLACE FOR 12 HOURS, THEN REMOVE AND LEAVE OFF FOR 12 HOURS.    lisinopril 40 mg, oral, Daily, TAKE 1 TABLET DAILY FOR BLOOD PRESSURE. please schedule follow up appointment    naloxone (NARCAN) 4 mg, nasal, As needed    nebivolol (BYSTOLIC) 5 mg, oral, Daily    oxyCODONE-acetaminophen (Percocet) 5-325 mg tablet Take 1 tablet by mouth every 6 hours if needed for severe pain (7 - 10). Do not fill before April 8, 2025.    pen needle, diabetic 32 gauge x 5/32\" needle No dose, route, or frequency recorded.    polyethylene glycol (Glycolax) 17 gram/dose powder Take by mouth.    sildenafil (Viagra) 25 mg tablet TAKE 1 TABLET DAILY 1 HOUR BEFORE NEEDED    Vitamin D2 1,250 mcg, oral, Once Weekly       Objective     Vitals: /89 (BP Location: Left arm, Patient Position: Sitting)   Pulse 64   Temp 36.1 °C (97 °F) (Temporal)   Ht 1.803 m (5' 11\")   Wt 88.5 kg (195 lb)   SpO2 98%   BMI 27.20 kg/m²    Physical Exam  Constitutional:       General: He is not in acute " distress.     Appearance: Normal appearance. He is not ill-appearing.   HENT:      Head: Normocephalic and atraumatic.   Eyes:      Extraocular Movements: Extraocular movements intact.      Conjunctiva/sclera: Conjunctivae normal.   Cardiovascular:      Rate and Rhythm: Normal rate and regular rhythm.      Heart sounds: Normal heart sounds. No murmur heard.     No friction rub. No gallop.   Pulmonary:      Effort: Pulmonary effort is normal. No respiratory distress.      Breath sounds: Normal breath sounds. No wheezing.   Abdominal:      General: There is no distension.      Palpations: Abdomen is soft.      Tenderness: There is no abdominal tenderness. There is no guarding.   Musculoskeletal:         General: Normal range of motion.      Cervical back: Normal range of motion.      Right lower leg: No edema.      Left lower leg: No edema.   Neurological:      Mental Status: He is alert.   Psychiatric:         Mood and Affect: Mood normal.         Behavior: Behavior normal.         PHQ2: Score of 0, negative       Food insecurity: Answered no  Food Insecurity: Not on File (6/15/2023)    Received from [employer]    Food Checkd.In     Food: 0       Assessment/Plan   Good Gee is a 45 y.o. male with PMH of T1DM, chronic low back pain, CKD who presents for Follow-up.    # Routine Health Maintenance  Immunization History   Administered Date(s) Administered    Flu vaccine (IIV4), preservative free *Check age/dose* 10/15/2018, 12/13/2022, 03/07/2024    Hepatitis B vaccine, 19 yrs and under (RECOMBIVAX, ENGERIX) 12/14/2020    Hepatitis B vaccine, adult *Check Product/Dose* 12/14/2020    Influenza, seasonal, injectable 12/13/2022    Moderna SARS-CoV-2 Vaccination 03/29/2021, 04/29/2021    Pneumococcal conjugate vaccine, 13-valent (PREVNAR 13) 09/13/2019    Pneumococcal conjugate vaccine, 20-valent (PREVNAR 20) 04/18/2025    Td (adult), unspecified 03/02/2015    Tdap vaccine, age 7 year and older (BOOSTRIX, ADACEL) 12/14/2020      - Flu vaccine: recommended annually,   - COVID vaccine: recommended completion of primary series and recommended boosters,   - Prevnar (PCV20): Given today  - Tdap: next due 2030  - Shingrix (50+): N/A  - STI screen: ordered GC, chlamydia, trich, syphilis, HIV, HepC  - Lifetime HepC: Ordered  - Lipid Panel (35M,45F): Ordered by another provider, encouraged to have it drawn  - DM screening: Ordered by another provider, encouraged to have it drawn  - HTN screening: mildly elevated  - Food Insecurity screen: Negative  - Depression: PHQ-2 Negative  - Tobacco Cessation: Never-smoker  - Last Dental: recommended follow up every 6mo   - Last Eye exam: recommended follow up annually   - Colonoscopy (45-75): Referral placed    #Chronic low back pain 2/2 to L3 compression fracture   - OARRS reviewed and appropriate  - Ordered a UDS today and signed new substance use agreement  - Plan to refill Percocet 325-5 mg BID PRN if UDS results are as expected  - c/w PT, Tylenol, Lidocaine patches, massages, and Icy hot as needed     #B/L Cerumen impaction  - Failed treatment with Debrox ear drops and irrigation  - Office ear irrigation machine still not functioning  - Referral to ENT placed      Problem List Items Addressed This Visit       Fracture lumbar vertebra-closed (Multi)    Relevant Orders    Opiate/Opioid/Benzo Prescription Compliance     Other Visit Diagnoses         Healthcare maintenance    -  Primary    Relevant Orders    Colonoscopy Screening; Average Risk Patient    Hepatic function panel      Routine screening for STI (sexually transmitted infection)        Relevant Orders    HIV 1/2 Antigen/Antibody Screen with Reflex to Confirmation    Syphilis Screen with Reflex    Hepatitis C antibody    C. trachomatis / N. gonorrhoeae, Amplified, Urogenital    Trichomonas vaginalis, Amplified      Colon cancer screening        Relevant Orders    Colonoscopy Screening; Average Risk Patient      Bilateral impacted cerumen         Relevant Orders    Referral to ENT      Chronic pain after traumatic injury        Relevant Orders    Opiate/Opioid/Benzo Prescription Compliance      Chronic pain disorder        Relevant Orders    Opiate/Opioid/Benzo Prescription Compliance            Attending Supervision: discussed with attending physician (cosigner listed on this note) - Dr. Taylor.    RTC in 2 months, or earlier as needed.    Reviewed and approved by JENNIFER JORDAN on 4/18/25 at 4:27 PM.

## 2025-04-20 LAB
1OH-MIDAZOLAM UR-MCNC: NORMAL NG/ML
7AMINOCLONAZEPAM UR-MCNC: NORMAL NG/ML
A-OH ALPRAZ UR-MCNC: NORMAL NG/ML
A-OH-TRIAZOLAM UR-MCNC: NORMAL NG/ML
AMPHETAMINES UR QL: NEGATIVE NG/ML
BARBITURATES UR QL: NEGATIVE NG/ML
BZE UR QL: NEGATIVE NG/ML
C TRACH RRNA SPEC QL NAA+PROBE: NOT DETECTED
CODEINE UR-MCNC: NEGATIVE NG/ML
CREAT UR-MCNC: 186.1 MG/DL
DRUG SCREEN COMMENT UR-IMP: NORMAL
EDDP UR-MCNC: NORMAL NG/ML
FENTANYL UR-MCNC: NEGATIVE NG/ML
HYDROCODONE UR-MCNC: NEGATIVE NG/ML
HYDROMORPHONE UR-MCNC: NEGATIVE NG/ML
LORAZEPAM UR-MCNC: NORMAL NG/ML
METHADONE UR-MCNC: NORMAL UG/L
MORPHINE UR-MCNC: NEGATIVE NG/ML
N GONORRHOEA RRNA SPEC QL NAA+PROBE: NOT DETECTED
NORDIAZEPAM UR-MCNC: NORMAL NG/ML
NORFENTANYL UR-MCNC: NEGATIVE NG/ML
NORHYDROCODONE UR CFM-MCNC: NEGATIVE NG/ML
NOROXYCODONE UR CFM-MCNC: NEGATIVE NG/ML
NORTRAMADOL UR-MCNC: NORMAL UG/ML
OH-ETHYLFLURAZ UR-MCNC: NORMAL NG/ML
OXAZEPAM UR-MCNC: NORMAL UG/ML
OXIDANTS UR QL: NEGATIVE MCG/ML
OXYCODONE UR CFM-MCNC: NEGATIVE NG/ML
OXYMORPHONE UR CFM-MCNC: NEGATIVE NG/ML
PCP UR QL: NEGATIVE NG/ML
PH UR: 6.5 [PH] (ref 4.5–9)
QUEST 6 ACETYLMORPHINE: NORMAL
QUEST GC CT AMPLIFIED (ALWAYS MESSAGE): NORMAL
QUEST NOTES AND COMMENTS: NORMAL
QUEST PATIENT HISTORICAL REPORT: NORMAL
QUEST ZOLPIDEM: NEGATIVE NG/ML
T VAGINALIS RRNA SPEC QL NAA+PROBE: NOT DETECTED
TEMAZEPAM UR-MCNC: NORMAL NG/ML
THC UR QL: NEGATIVE NG/ML
TRAMADOL UR-MCNC: NORMAL UG/ML
ZOLPIDEM PHENYL-4-CARB UR CFM-MCNC: NEGATIVE NG/ML

## 2025-04-22 LAB
1OH-MIDAZOLAM UR-MCNC: NEGATIVE NG/ML
7AMINOCLONAZEPAM UR-MCNC: NEGATIVE NG/ML
A-OH ALPRAZ UR-MCNC: NEGATIVE NG/ML
A-OH-TRIAZOLAM UR-MCNC: NEGATIVE NG/ML
AMPHETAMINES UR QL: NEGATIVE NG/ML
BARBITURATES UR QL: NEGATIVE NG/ML
BZE UR QL: NEGATIVE NG/ML
C TRACH RRNA SPEC QL NAA+PROBE: NOT DETECTED
CODEINE UR-MCNC: NEGATIVE NG/ML
CREAT UR-MCNC: 186.1 MG/DL
DRUG SCREEN COMMENT UR-IMP: NORMAL
EDDP UR-MCNC: NEGATIVE NG/ML
FENTANYL UR-MCNC: NEGATIVE NG/ML
HYDROCODONE UR-MCNC: NEGATIVE NG/ML
HYDROMORPHONE UR-MCNC: NEGATIVE NG/ML
LORAZEPAM UR-MCNC: NEGATIVE NG/ML
METHADONE UR-MCNC: NEGATIVE NG/ML
MORPHINE UR-MCNC: NEGATIVE NG/ML
N GONORRHOEA RRNA SPEC QL NAA+PROBE: NOT DETECTED
NORDIAZEPAM UR-MCNC: NEGATIVE NG/ML
NORFENTANYL UR-MCNC: NEGATIVE NG/ML
NORHYDROCODONE UR CFM-MCNC: NEGATIVE NG/ML
NOROXYCODONE UR CFM-MCNC: NEGATIVE NG/ML
NORTRAMADOL UR-MCNC: NEGATIVE NG/ML
OH-ETHYLFLURAZ UR-MCNC: NEGATIVE NG/ML
OXAZEPAM UR-MCNC: NEGATIVE NG/ML
OXIDANTS UR QL: NEGATIVE MCG/ML
OXYCODONE UR CFM-MCNC: NEGATIVE NG/ML
OXYMORPHONE UR CFM-MCNC: NEGATIVE NG/ML
PCP UR QL: NEGATIVE NG/ML
PH UR: 6.5 [PH] (ref 4.5–9)
QUEST 6 ACETYLMORPHINE: NEGATIVE NG/ML
QUEST GC CT AMPLIFIED (ALWAYS MESSAGE): NORMAL
QUEST NOTES AND COMMENTS: NORMAL
QUEST ZOLPIDEM: NEGATIVE NG/ML
T VAGINALIS RRNA SPEC QL NAA+PROBE: NOT DETECTED
TEMAZEPAM UR-MCNC: NEGATIVE NG/ML
THC UR QL: NEGATIVE NG/ML
TRAMADOL UR-MCNC: NEGATIVE NG/ML
ZOLPIDEM PHENYL-4-CARB UR CFM-MCNC: NEGATIVE NG/ML

## 2025-04-25 DIAGNOSIS — G89.4 CHRONIC PAIN DISORDER: Primary | ICD-10-CM

## 2025-04-25 DIAGNOSIS — S32.030S COMPRESSION FRACTURE OF L3 VERTEBRA, SEQUELA: ICD-10-CM

## 2025-04-25 DIAGNOSIS — G89.21 CHRONIC PAIN AFTER TRAUMATIC INJURY: ICD-10-CM

## 2025-04-25 RX ORDER — OXYCODONE AND ACETAMINOPHEN 5; 325 MG/1; MG/1
1 TABLET ORAL 2 TIMES DAILY PRN
Qty: 50 TABLET | Refills: 0 | Status: SHIPPED | OUTPATIENT
Start: 2025-07-07 | End: 2025-08-06

## 2025-04-25 RX ORDER — OXYCODONE AND ACETAMINOPHEN 5; 325 MG/1; MG/1
1 TABLET ORAL 2 TIMES DAILY PRN
Qty: 50 TABLET | Refills: 0 | Status: SHIPPED | OUTPATIENT
Start: 2025-05-08 | End: 2025-06-07

## 2025-04-25 RX ORDER — OXYCODONE AND ACETAMINOPHEN 5; 325 MG/1; MG/1
1 TABLET ORAL 2 TIMES DAILY PRN
Qty: 50 TABLET | Refills: 0 | Status: SHIPPED | OUTPATIENT
Start: 2025-06-07 | End: 2025-07-07

## 2025-04-30 LAB
ALBUMIN SERPL-MCNC: 4 G/DL (ref 3.6–5.1)
ALBUMIN/CREAT UR: 126 MG/G CREAT
BUN SERPL-MCNC: 28 MG/DL (ref 7–25)
BUN/CREAT SERPL: 12 (CALC) (ref 6–22)
CALCIUM SERPL-MCNC: 8.8 MG/DL (ref 8.6–10.3)
CHLORIDE SERPL-SCNC: 106 MMOL/L (ref 98–110)
CHOLEST SERPL-MCNC: 160 MG/DL
CHOLEST/HDLC SERPL: 2.7 (CALC)
CO2 SERPL-SCNC: 24 MMOL/L (ref 20–32)
CREAT SERPL-MCNC: 2.37 MG/DL (ref 0.6–1.29)
CREAT UR-MCNC: 180 MG/DL (ref 20–320)
EGFRCR SERPLBLD CKD-EPI 2021: 33 ML/MIN/1.73M2
EST. AVERAGE GLUCOSE BLD GHB EST-MCNC: 183 MG/DL
EST. AVERAGE GLUCOSE BLD GHB EST-SCNC: 10.1 MMOL/L
GLUCOSE SERPL-MCNC: 210 MG/DL (ref 65–99)
HBA1C MFR BLD: 8 %
HDLC SERPL-MCNC: 60 MG/DL
LDLC SERPL CALC-MCNC: 86 MG/DL (CALC)
MICROALBUMIN UR-MCNC: 22.6 MG/DL
NONHDLC SERPL-MCNC: 100 MG/DL (CALC)
PHOSPHATE SERPL-MCNC: 2.7 MG/DL (ref 2.5–4.5)
POTASSIUM SERPL-SCNC: 4.3 MMOL/L (ref 3.5–5.3)
SODIUM SERPL-SCNC: 140 MMOL/L (ref 135–146)
T4 FREE SERPL-MCNC: 0.9 NG/DL (ref 0.8–1.8)
TRIGL SERPL-MCNC: 57 MG/DL
TSH SERPL-ACNC: 1.02 MIU/L (ref 0.4–4.5)

## 2025-05-01 PROCEDURE — RXMED WILLOW AMBULATORY MEDICATION CHARGE

## 2025-05-02 ENCOUNTER — OFFICE VISIT (OUTPATIENT)
Facility: HOSPITAL | Age: 45
End: 2025-05-02
Payer: COMMERCIAL

## 2025-05-02 ENCOUNTER — PHARMACY VISIT (OUTPATIENT)
Dept: PHARMACY | Facility: CLINIC | Age: 45
End: 2025-05-02
Payer: MEDICAID

## 2025-05-02 VITALS
OXYGEN SATURATION: 98 % | SYSTOLIC BLOOD PRESSURE: 121 MMHG | HEART RATE: 79 BPM | HEIGHT: 71 IN | WEIGHT: 194 LBS | BODY MASS INDEX: 27.16 KG/M2 | TEMPERATURE: 96.9 F | DIASTOLIC BLOOD PRESSURE: 78 MMHG

## 2025-05-02 DIAGNOSIS — Z00.00 MEDICARE ANNUAL WELLNESS VISIT, SUBSEQUENT: Primary | ICD-10-CM

## 2025-05-02 ASSESSMENT — PAIN SCALES - GENERAL: PAINLEVEL_OUTOF10: 7

## 2025-05-02 NOTE — PROGRESS NOTES
Welcome to Medicare physical  Medicare initial prevention physical examination encounter    YOB: 1980  Medical record number: [id number]  Medicare B eligibility date  Date of exam: 05/02/25  Date of last exam: 4/18/2025    Social history:    Drug allergies: NKDA  Tobacco use: Never-smoker  Alcohol use: rarely  Drug use: Declined    Social history notes (including diet and physical activities):    Family history:  - HTN runs in the family    Depression screen  1.  Over the past 2 weeks, have you felt down, depressed or hopeless? No  2.  Over the past 2 weeks, have you felt little interest or pleasure in doing things? No    Functional ability and safety screen  1.  Was the patient's time up and go test unsteady or longer than 30 seconds? Yes  2.  Do you need help with phone, transportation, shopping, preparing meals, housework, laundry, medications or managing money? No  3.  Does her home have rugs in the hallway - no, grab bars in the bathroom - no, handrails on the stairs - yes or have poor lighting - yes  4.  Have you noticed any hearing difficulties? No  Hearing evaluation:  (Yes response any of the questions regarding depression or function for safety should trigger further evaluation.)    Subjective   Reason for Visit: Good Gee is an 45 y.o. male here for a Medicare Wellness visit.          Reviewed all medications by prescribing practitioner or clinical pharmacist (such as prescriptions, OTCs, herbal therapies and supplements) and documented in the medical record.    HPI                        Patient Care Team:  Cosme Hihgtower MD as PCP - General (Family Medicine)  Ingrid Gonzalez MD as PCP - [facility] PCP  Coy Ventura MD as PCP - [facility] Medicaid PCP  Luke Leyva MD as Nephrologist (Nephrology)     Review of Systems    Objective   Vitals:  /78 (BP Location: Right arm, Patient Position: Sitting, BP Cuff Size: Adult)   Pulse 79   Temp 36.1 °C (96.9 °F) (Temporal)   Ht 1.803 m  "(5' 11\")   Wt 88 kg (194 lb)   SpO2 98%   BMI 27.06 kg/m²       Physical Exam  Constitutional:       General: He is not in acute distress.     Appearance: Normal appearance. He is not ill-appearing.   HENT:      Head: Normocephalic and atraumatic.   Eyes:      Extraocular Movements: Extraocular movements intact.      Conjunctiva/sclera: Conjunctivae normal.   Cardiovascular:      Rate and Rhythm: Normal rate and regular rhythm.      Heart sounds: Normal heart sounds. No murmur heard.     No friction rub. No gallop.   Pulmonary:      Effort: Pulmonary effort is normal. No respiratory distress.      Breath sounds: Normal breath sounds. No wheezing.   Musculoskeletal:         General: Normal range of motion.      Cervical back: Normal range of motion.   Neurological:      Mental Status: He is alert.   Psychiatric:         Mood and Affect: Mood normal.         Behavior: Behavior normal.         Assessment/Plan   Problem List Items Addressed This Visit    None  Good Gee is a 45 year old male with a PMHx of PMH of T1DM, chronic low back pain, CKD who presents for Medicare annual wellness visit.            Electrocardiogram (no longer required)    Evaluations and referrals based on the history, exam and screening:    Health Maintenance Plan  # Routine Health Maintenance  - Refer to visit from 4/18/2025    # Medicare Wellness Questionnaire  - Age: 45  - Gender: male   - During the past 4 weeks, how much have you been bothered by emotional problems such as feeling anxious, depressed, irritable, sad, or downhearted and blue? None  - During the past 4 weeks, history of physical and emotional health limited your social activities with family, friends, neighbors, or groups? No  - During the past 4 weeks, how much bodily pain have you generally had? 8/10  - During the past 4 weeks, was someone available to help you if you needed and wanted help?  (For example, if you felt very nervous, lonely, or blue; got sick and had to " stay in bed; needed someone to talk to; needed help with daily chores; or needed help just taking care of yourself.) Yes  - During the past 4 weeks, what was the hardest physical activity you could do for at least 2 minutes? Picking up his grandson  - ADLS/IADLs:  --- Can you get to places out of walking distance without help?  (For example, can you travel alone on buses or taxis, or drive your own car?) Yes  --- Can you go shopping for groceries or clothes without someone's help? Yes  --- Can you prepare your own meals? Yes  --- Can you do your housework without help? Yes  --- Because of any health problems, do you need the help of another person with your personal care needs such as eating, bathing, dressing, or getting around the house? No  --- Can you handle your own money without help? Yes  - During the past 4 weeks, how would you rate your health in general? Good  - How have things been going for you during the past 4 weeks? Good  - Are you having difficulties driving your car? No  - Do you always fasten your seatbelt when you are in a car? No  - How often during the past 4 weeks have you been bothered by any of the following problems?  --- Falling or dizzy when standing up. No  --- Sexual problems. sometimes  --- Trouble eating well. No  --- Teeth or denture problems. Yes  --- Problems using the telephone. No  --- Tiredness or fatigue. No  - Have you fallen 2 or more times in the past year? Yes  - Are you afraid of falling? Yes  - Are you a smoker? Never-smoker  - During the past 4 weeks, how many drinks of wine, beer, or other alcoholic beverages did you have? 3  - Do you exercise for about 20 minutes, 3 or more days a week? Yes  - Have you been given any information to help you with the following:  --- Hazards in your house that might hurt you? No  --- Keeping track of your medications? No  - How often do you have trouble taking medicines the way you have been told to take them? I always take them as  prescribed  - How confident are you that you can control and manage most of your health problems? Confident  - POA - Does not have one but is thinking about getting one. Given handout  - Living will - Does not have one but is meeting with his  next month to discuss it. Given handout    RTC in 2 months for a follow up.    The patient was discussed with Dr. Brandon Hightower MD  Family Medicine  PGY-3

## 2025-05-04 LAB
ALBUMIN SERPL-MCNC: 4 G/DL (ref 3.6–5.1)
ALBUMIN/GLOB SERPL: 1.2 (CALC) (ref 1–2.5)
ALP SERPL-CCNC: 55 U/L (ref 36–130)
ALT SERPL-CCNC: 9 U/L (ref 9–46)
AST SERPL-CCNC: 15 U/L (ref 10–40)
BILIRUB DIRECT SERPL-MCNC: 0.3 MG/DL
BILIRUB INDIRECT SERPL-MCNC: 1 MG/DL (CALC) (ref 0.2–1.2)
BILIRUB SERPL-MCNC: 1.3 MG/DL (ref 0.2–1.2)
GLOBULIN SER CALC-MCNC: 3.3 G/DL (CALC) (ref 1.9–3.7)
HCV AB SERPL QL IA: NORMAL
HIV 1+2 AB+HIV1 P24 AG SERPL QL IA: NORMAL
PROT SERPL-MCNC: 7.3 G/DL (ref 6.1–8.1)
T PALLIDUM AB SER QL IA: NEGATIVE

## 2025-05-07 DIAGNOSIS — S32.030S COMPRESSION FRACTURE OF L3 VERTEBRA, SEQUELA: ICD-10-CM

## 2025-05-07 DIAGNOSIS — E21.3 HYPERPARATHYROIDISM (MULTI): ICD-10-CM

## 2025-05-07 DIAGNOSIS — F11.90 CHRONIC, CONTINUOUS USE OF OPIOIDS: ICD-10-CM

## 2025-05-07 DIAGNOSIS — G89.21 CHRONIC PAIN AFTER TRAUMATIC INJURY: ICD-10-CM

## 2025-05-07 DIAGNOSIS — G89.4 CHRONIC PAIN DISORDER: ICD-10-CM

## 2025-05-08 PROCEDURE — RXMED WILLOW AMBULATORY MEDICATION CHARGE

## 2025-05-08 RX ORDER — OXYCODONE AND ACETAMINOPHEN 5; 325 MG/1; MG/1
1 TABLET ORAL EVERY 6 HOURS PRN
Qty: 50 TABLET | Refills: 0 | OUTPATIENT
Start: 2025-05-08 | End: 2025-06-07

## 2025-05-08 RX ORDER — NALOXONE HYDROCHLORIDE 4 MG/.1ML
4 SPRAY NASAL AS NEEDED
Qty: 2 EACH | Refills: 1 | Status: SHIPPED | OUTPATIENT
Start: 2025-05-08

## 2025-05-12 ENCOUNTER — PHARMACY VISIT (OUTPATIENT)
Dept: PHARMACY | Facility: CLINIC | Age: 45
End: 2025-05-12
Payer: MEDICAID

## 2025-05-12 PROCEDURE — RXMED WILLOW AMBULATORY MEDICATION CHARGE

## 2025-05-12 RX ORDER — CALCITRIOL 0.25 UG/1
0.25 CAPSULE ORAL EVERY OTHER DAY
Qty: 45 CAPSULE | Refills: 3 | Status: SHIPPED | OUTPATIENT
Start: 2025-05-12 | End: 2026-05-12

## 2025-05-30 ENCOUNTER — APPOINTMENT (OUTPATIENT)
Dept: NEPHROLOGY | Facility: CLINIC | Age: 45
End: 2025-05-30
Payer: COMMERCIAL

## 2025-05-30 VITALS
DIASTOLIC BLOOD PRESSURE: 79 MMHG | SYSTOLIC BLOOD PRESSURE: 133 MMHG | HEIGHT: 71 IN | WEIGHT: 202.6 LBS | HEART RATE: 61 BPM | BODY MASS INDEX: 28.36 KG/M2

## 2025-05-30 DIAGNOSIS — I10 BENIGN ESSENTIAL HTN: ICD-10-CM

## 2025-05-30 DIAGNOSIS — R80.8 OTHER PROTEINURIA: ICD-10-CM

## 2025-05-30 DIAGNOSIS — N18.9 CHRONIC KIDNEY DISEASE, UNSPECIFIED CKD STAGE: ICD-10-CM

## 2025-05-30 DIAGNOSIS — E08.22 DIABETES MELLITUS DUE TO UNDERLYING CONDITION WITH DIABETIC CHRONIC KIDNEY DISEASE, UNSPECIFIED CKD STAGE, UNSPECIFIED WHETHER LONG TERM INSULIN USE: ICD-10-CM

## 2025-05-30 PROCEDURE — 99214 OFFICE O/P EST MOD 30 MIN: CPT | Performed by: INTERNAL MEDICINE

## 2025-05-30 PROCEDURE — 3075F SYST BP GE 130 - 139MM HG: CPT | Performed by: INTERNAL MEDICINE

## 2025-05-30 PROCEDURE — 1036F TOBACCO NON-USER: CPT | Performed by: INTERNAL MEDICINE

## 2025-05-30 PROCEDURE — 4010F ACE/ARB THERAPY RXD/TAKEN: CPT | Performed by: INTERNAL MEDICINE

## 2025-05-30 PROCEDURE — RXMED WILLOW AMBULATORY MEDICATION CHARGE

## 2025-05-30 PROCEDURE — 3008F BODY MASS INDEX DOCD: CPT | Performed by: INTERNAL MEDICINE

## 2025-05-30 PROCEDURE — 3051F HG A1C>EQUAL 7.0%<8.0%: CPT | Performed by: INTERNAL MEDICINE

## 2025-05-30 PROCEDURE — 3078F DIAST BP <80 MM HG: CPT | Performed by: INTERNAL MEDICINE

## 2025-05-30 RX ORDER — NEBIVOLOL 5 MG/1
5 TABLET ORAL DAILY
Qty: 90 TABLET | Refills: 3 | Status: SHIPPED | OUTPATIENT
Start: 2025-05-30 | End: 2026-05-30

## 2025-05-30 NOTE — PROGRESS NOTES
Good Gee   45 y.o.    @@  [facility]/Room: [id number]/Room/bed info not found    Subjective:   The patient is being seen for a routine clinic follow-up of chronic kidney disease. Recently, the disease has been stable. Disease complications:  No hyperkalemia, no hypocalcemia, no hyperphosphatemia, no metabolic acidosis, no coagulopathy, no uremic encephalopathy, no neuropathy and no renal osteodystrophy. The patient is currently asymptomatic. No associated symptoms are reported.       Meds:   Current Medications[1]       ROS:  The patient is awake and oriented. No dizziness or lightheadedness. No chills and no fever. No headaches. No nausea and no vomiting. No shortness of breath. No cough. No chest pain. No abdominal pain. No diarrhea. No hematemesis or hemoptysis. No hematuria. No rectal bleeding. No melena. No epistaxis. No urinary symptoms. No flank pain. No leg edema. No itching. Overall, the rest of the review of systems is also negative.  12 point review of systems otherwise negative as stated in HPI.        Physical Examination:        Vitals:    05/30/25 1153   BP: 133/79   Pulse: 61     General: The patient is awake, oriented, and is not in any distress.  Head and Neck: Normocephalic. No periorbital edema.  Eyes: non-icteric  Respiratory: Symmetric chest expansion. No respiratory distress.  Skin: No maculopapular rash.  Musculoskeletal: No peripheral edema.  Neuro Exam: Speech is fluent. Moves extremities.    Imaging:         Blood Labs:  No results found for this or any previous visit (from the past 24 hours).   Lab Results   Component Value Date    .7 (H) 05/01/2024    PROTUR 30 (1+) (A) 10/02/2020    PHOS 2.7 04/29/2025      Lab Results   Component Value Date    GLUCOSE 210 (H) 04/29/2025    CALCIUM 8.8 04/29/2025     04/29/2025    K 4.3 04/29/2025    CO2 24 04/29/2025     04/29/2025    BUN 28 (H) 04/29/2025    CREATININE 2.37 (H) 04/29/2025         Assessment and Plan:  #1 chronic kidney  disease stage III.  Last creatinine was 2.37.  Normal potassium and bicarb level.  Volume status is good.    2.  Hypertension.  Blood pressure is acceptable.  He is on multiple antihypertensive medications.  We talked about low-salt diet.  I added Bystolic to his medications.     3.  Proteinuria.  126 mg albuminuria which is down from 800 mg.  This is because of diabetic nephropathy.  He is on maximum dose of lisinopril.  Since he has type 1 diabetes he is not a candidate for an SGLT2 inhibitor.  I added Kerendia to his medications.  Basic metabolic panel will be checked in about 3 to 4 weeks.     See him in about 6 months for follow-up.          Luke Leyva MD  Senior Attending Physician  Director of Onco-Nephrology Program  Division of Nephrology & Hypertension  [facility]       [1]   Current Outpatient Medications   Medication Sig Dispense Refill    acetaminophen (Tylenol) 500 mg tablet take 2 tablets BY by mouth every 6 hours if needed      amLODIPine (Norvasc) 10 mg tablet Take 1 tablet (10 mg) by mouth once daily. 90 tablet 3    atorvastatin (Lipitor) 40 mg tablet Take 1 tablet (40 mg) by mouth once daily at bedtime. 90 tablet 3    blood sugar diagnostic (Accu-Chek Guide test strips) strip TEST 4 TIMES DAILY for CGM Guardian calibration 200 strip 3    calcitriol (Rocaltrol) 0.25 mcg capsule Take 1 capsule (0.25 mcg) by mouth every other day. 45 capsule 3    cetirizine (ZyrTEC) 10 mg tablet Take 1 tablet (10 mg) by mouth once daily. TAKE 1 TABLET Daily as needed for  seasonal allergies 90 tablet 3    chlorthalidone (Hygroton) 50 mg tablet Take 1 tablet (50 mg) by mouth once daily. 90 tablet 3    ergocalciferol (Vitamin D2) 1250 mcg (50,000 units) capsule Take 1 capsule (1,250 mcg) by mouth 1 (one) time per week. 12 capsule 2    fluticasone (Flonase) 50 mcg/actuation nasal spray Administer 1 spray into affected nostril(s) 2 times a day.      glucagon (Glucagen) 1 mg  "injection Inject 1 mg under the skin 1 time if needed for low blood sugar - see comments (for severe hypoglycemia as needed). 1 kit 11    hydrALAZINE (Apresoline) 25 mg tablet Take 1 tablet (25 mg) by mouth 3 times a day. 90 tablet 3    hydrOXYzine HCL (Atarax) 25 mg tablet Take 1 tablet (25 mg) by mouth every 4 hours if needed.      insulin lispro 100 unit/mL injection INJECT 60 UNITS UNDER THE SKIN ONCE DAILY VIA INSULIN PUMP. 60 mL 3    lancing device/lancets (ACCU-CHEK FASTCLIX LANCING DEV [facility]) Patient is to test up to 5 times daily as needed      lidocaine (Lidoderm) 5 % patch Place 1 patch over 12 hours on the skin once daily. APPLY 1 PATCH TO THE AFFECTED AREA AND LEAVE IN PLACE FOR 12 HOURS, THEN REMOVE AND LEAVE OFF FOR 12 HOURS. 30 patch 2    lisinopril 40 mg tablet Take 1 tablet (40 mg) by mouth once daily. TAKE 1 TABLET DAILY FOR BLOOD PRESSURE. please schedule follow up appointment 90 tablet 3    naloxone (Narcan) 4 mg/0.1 mL nasal spray Administer 1 spray (4 mg) into affected nostril(s) if needed for opioid reversal or respiratory depression. 2 each 1    nebivolol (Bystolic) 5 mg tablet Take 1 tablet (5 mg) by mouth once daily. 90 tablet 3    oxyCODONE-acetaminophen (Percocet) 5-325 mg tablet Take 1 tablet by mouth 2 times a day as needed for severe pain (7 - 10). Do not fill before May 8, 2025. 50 tablet 0    [START ON 6/7/2025] oxyCODONE-acetaminophen (Percocet) 5-325 mg tablet Take 1 tablet by mouth 2 times a day as needed for severe pain (7 - 10). Do not fill before June 7, 2025. 50 tablet 0    [START ON 7/7/2025] oxyCODONE-acetaminophen (Percocet) 5-325 mg tablet Take 1 tablet by mouth 2 times a day as needed for severe pain (7 - 10). Do not fill before July 7, 2025. 50 tablet 0    pen needle, diabetic 32 gauge x 5/32\" needle       glucagon 1 mg injection USE AS DIRECTED. (Patient not taking: Reported on 5/30/2025)      polyethylene glycol (Glycolax) 17 gram/dose powder Take by mouth.      " sildenafil (Viagra) 25 mg tablet TAKE 1 TABLET DAILY 1 HOUR BEFORE NEEDED       No current facility-administered medications for this visit.

## 2025-06-04 ENCOUNTER — APPOINTMENT (OUTPATIENT)
Facility: CLINIC | Age: 45
End: 2025-06-04
Payer: COMMERCIAL

## 2025-06-04 ENCOUNTER — CLINICAL SUPPORT (OUTPATIENT)
Dept: AUDIOLOGY | Facility: CLINIC | Age: 45
End: 2025-06-04
Payer: COMMERCIAL

## 2025-06-04 VITALS
WEIGHT: 202 LBS | HEIGHT: 71 IN | SYSTOLIC BLOOD PRESSURE: 134 MMHG | DIASTOLIC BLOOD PRESSURE: 84 MMHG | BODY MASS INDEX: 28.28 KG/M2

## 2025-06-04 DIAGNOSIS — H61.23 BILATERAL IMPACTED CERUMEN: Primary | ICD-10-CM

## 2025-06-04 DIAGNOSIS — H93.8X3 SENSATION OF PLUGGED EAR, BILATERAL: ICD-10-CM

## 2025-06-04 DIAGNOSIS — H61.23 EXCESSIVE CERUMEN IN EAR CANAL, BILATERAL: ICD-10-CM

## 2025-06-04 DIAGNOSIS — H90.12 CONDUCTIVE HEARING LOSS OF LEFT EAR WITH UNRESTRICTED HEARING OF RIGHT EAR: Primary | ICD-10-CM

## 2025-06-04 PROCEDURE — 3075F SYST BP GE 130 - 139MM HG: CPT

## 2025-06-04 PROCEDURE — 3051F HG A1C>EQUAL 7.0%<8.0%: CPT

## 2025-06-04 PROCEDURE — 3008F BODY MASS INDEX DOCD: CPT

## 2025-06-04 PROCEDURE — 3079F DIAST BP 80-89 MM HG: CPT

## 2025-06-04 PROCEDURE — 69210 REMOVE IMPACTED EAR WAX UNI: CPT

## 2025-06-04 PROCEDURE — 92550 TYMPANOMETRY & REFLEX THRESH: CPT | Mod: 52 | Performed by: AUDIOLOGIST

## 2025-06-04 PROCEDURE — 92557 COMPREHENSIVE HEARING TEST: CPT | Performed by: AUDIOLOGIST

## 2025-06-04 PROCEDURE — RXMED WILLOW AMBULATORY MEDICATION CHARGE

## 2025-06-04 PROCEDURE — 1036F TOBACCO NON-USER: CPT

## 2025-06-04 PROCEDURE — 99203 OFFICE O/P NEW LOW 30 MIN: CPT

## 2025-06-04 PROCEDURE — 4010F ACE/ARB THERAPY RXD/TAKEN: CPT

## 2025-06-04 ASSESSMENT — PAIN SCALES - GENERAL: PAINLEVEL_OUTOF10: 4

## 2025-06-04 ASSESSMENT — ENCOUNTER SYMPTOMS: OCCASIONAL FEELINGS OF UNSTEADINESS: 0

## 2025-06-04 ASSESSMENT — PAIN - FUNCTIONAL ASSESSMENT: PAIN_FUNCTIONAL_ASSESSMENT: 0-10

## 2025-06-04 NOTE — PROGRESS NOTES
Patient ID: Good Gee is a 45 y.o. male who presents for ear evaluation and for earwax removal. Patient presents as a referral from Dr. Tiffany Taylor (PCP).     PROVIDER IMPRESSIONS:  DIAGNOSES/PROBLEMS:  - Cerumen impaction of both ears   - a plugged sensation in both ears     ASSESSMENT: Good Gee is a 45 y.o. male who presents for an initial encounter with symptoms and clinical findings that are consistent with bilateral cerumen impaction. Using appropriate instrumentation, impacted cerumen was successfully removed from the EAC bilaterally. Patient does endorse complete symptom resolution immediately following the procedure today. Otologic exam today under microscopy revealed no evidence of EAC infection/inflammation bilaterally and no evidence of infection, effusion, retraction or perforation of the TM bilaterally. Audiogram was reviewed in detail, which showed normal hearing with normal tympanogram in the right ear, and conductive left hearing loss with type B tympanogram in the left ear. I explained to patient that left ear audiogram results are consistent with complete left cerumen impaction during audiogram test today, which was immediately prior to this visit.      PLAN:   -I counseled patient on safe interventions for cerumen management at home. Patient may wash the external ear with a cloth. I reinforced education to the patient that they should avoid using cotton tipped applicators, tissues, paper, or any rigid object in the ear canal. I explained to the patient that doing so may cause wax to be pushed back into the ear canal and stuck and also risks injury to the ear canal and ear drum.   -Follow-up: Patient may schedule for routine evaluation for the removal of cerumen impaction in 1 year, sooner as needed. Patient is agreeable to plan. All questions answered to patient satisfaction.    Subjective    HPI: Good Gee is a 45 y.o. male who is referred by PCP and presents for an initial evaluation for  the ears and earwax removal. Patient reports that for the past 1 year he has noticed an intermittent plugged ear sensation (left>right) and left hearing difficulty. He also has noticed occasional ear popping in the left ear. Patient denies current symptoms of right hearing difficulty. Denies current symptoms of tinnitus, ear pain, ear drainage, ear itching, dizziness or vertigo.  He was seen by PCP on 4/18/25 after he was previously advised to utilize o.t.c. ceruminolytic agents for symptoms, but was told that both ears remained blocked with earwax. The patient does not insert Q-tips and/or foreign objects in the ear canals. Patient denies a past medical history of recurrent ear infections. Patient denies any prior history of ear surgery. Patient denies history of PE tube insertion. Patient denies history of prolonged/traumatic loud noise exposure.     REVIEW OF SYSTEMS:  All other systems negative.    Objective   Physical Exam:  Right Ear: External inspection of ear with no deformity, scars, or masses. EAC is partially impacted with cerumen. Unable to visualize tympanic membrane.  Left Ear: External inspection of ear with no deformity, scars, or masses. EAC is deeply/completely impacted with cerumen. Unable to visualize tympanic membrane.     Nose: External inspection of nose: No nasal lesions, lacerations or scars.   Neurologic: Cranial nerves II-XII grossly intact and symmetric bilaterally.  Head and Face:  Head: Atraumatic with no masses, lesions or scarring.  Face: Normal symmetry. No scars or deformities.  Eyes: Conjunctiva not edematous or erythematous.   Neck: Normal appearing, symmetric, trachea midline.   Cardiovascular: Examination of peripheral vascular system shows no clubbing or cyanosis.  Respiratory: No respiratory distress increased work of breathing. Inspection of the chest with symmetric chest expansion and normal respiratory effort.  Skin: No head and neck rashes.    EAR CLEANING PROCEDURE  NOTE:  Indication: Cerumen impaction  Location: bilateral ear canal(s)  Procedure Note: The procedure was performed by the provider.  Visualization Instrument: A microscope with a #5 speculum was placed in the ear canals to visualize the ear canal debris.  Ear Cleaning Instrument and Outcome: Using the 3/5/7 suction, a large amount of soft, yellow cerumen was removed from the impacted EAC(s).  Patient Status: The patient tolerated the procedure well.  Complications: There were no complications.  Post-Procedure/Microscopic Otologic Exam:  Right Ear--EAC is clear. TM is intact with no sign of infection, effusion, or retraction.  No perforation seen. Auto insufflation visible under microscopy.  Left Ear-- EAC is clear and slightly curved. TM is intact with no sign of infection, effusion, or retraction.  No perforation seen. Auto insufflation visible under microscopy.    RESULTS:  I personally reviewed the patient's audiogram from 06/04/25, which revealed the following results: Right ear with normal peripheral hearing across all frequencies. Left ear with mild to moderate conductive hearing loss across all frequencies. Right ear with normal tympanogram, and left ear with Type B tympanogram. Right ear with 96% WRS at 45 dB, and left ear with 88% WRS at 65 dB. Acoustic reflexes present right ipsilateral, and absent left ipsilateral.   Note: Bilateral cerumen impaction (left>right) present during testing, which was immediately prior to this visit. Interpret left ear results with caution.

## 2025-06-04 NOTE — PROGRESS NOTES
AUDIOLOGY ADULT AUDIOMETRIC EVALUATION      Name:  Good Gee  :  1980  Age:  45 y.o.  Date of Evaluation: 25    History:  Reason for visit:  Mr. Good Gee was seen today as part of the visit with OMAIRA Singh for an evaluation of hearing.   Chief Complaint   Patient presents with    Ear Fullness    Earache    Hearing Problem     The patient reported for the past year he has noticed a gradual sensation of a clogged ear, particularly on the left. Mentioned there has been a concern for excessive cerumen, and although he has attempted to remove the cerumen at home, it continues to persist. Although prescribed ear drops did help his condition, he does not believe he is back to his baseline.  Stated he has noticed some recent ear pressure in each ear, with slight otalgia noticed in the left ear. Rated her current pain as a 4/10 on the pain scale and stated it feels like there is pressure, with a need to pop the ear.   Reported some recent onset of headaches, which he believes may have been due to the pressure from his ears.   Denied any difficulty hearing or communicating, however, reported difficulty hearing soft speech, particularly in the left ear.   Denied any tinnitus, dizziness/vertigo, ear surgery, recent ear/sinus infections, recent falls, significant noise exposure, sinus/throat concerns, ear drainage, or sudden hearing loss.    EVALUATION   See Audiogram    RESULTS:    Otoscopic Evaluation:   Right Ear: Otoscopy revealed deep soft occluding cerumen and the tympanic membrane was unable able to be clearly visualized.   Left Ear: Otoscopy revealed deep soft occluding cerumen and the tympanic membrane was unable able to be clearly visualized.     Immittance:  Immittance Measures: 226 Hz   Right Ear: Tympanometric testing revealed a normal type A tympanogram with normal middle ear pressure and normal static compliance.  Left Ear: Tympanometric testing revealed a type B flat  tympanogram with no measurable middle ear pressure or static compliance and normal ear canal volume. Results may be consistent with excessive cerumen.     Right Ear: Ipsilateral acoustic reflexes were present at, 500-4,000 Hz, at expected sensation levels.  Left Ear: Ipsilateral acoustic reflexes were absent at, 500-4,000 Hz. Results are consistent with the test results.     Test technique:  Pure Tone Audiometry via TDH headphones    Reliability:   excellent    Pure Tone Audiometry:    Right Ear: Audiometric testing indicated normal peripheral hearing sensitivity from 125-8,000 Hz.   Left Ear:   Audiometric testing indicated a moderate to mild conductive hearing loss across the frequency range. Note slight reverse cookie bite type configuration.       Speech Audiometry:   Right Ear:  Speech Reception Threshold (SRT) was obtained at 5 dBHL                  Word Recognition scores were excellent (96%) in quiet when words were presented at 45 dBHL  Left Ear:  Speech Reception Threshold (SRT) was obtained at  25 dBHL                  Word Recognition scores were good (88%) in quiet when words were presented at 65 dBHL  Testing was performed with recorded NU-6 speech words in quiet. Speech thresholds were in good agreement with the pure tone averages in each ear.     IMPRESSIONS:  Today's test results are hearing loss requiring medical/otologic follow-up.  The patient was counseled with regard to the findings.    RECOMMENDATIONS:  * Continue medical follow up with OMAIRA Singh.  * Retest as medically indicated, or sooner if a change in hearing sensitivity is noticed.   * Wear hearing protection while in the presence of loud sounds.   * Use effective communication strategies such as asking the speaker to gain attention prior to speaking, speaking in the same room, repeating words that were heard, etc.  *  PATIENT EDUCATION:   Discussed results and recommendations with the patient and a copy of the hearing  test was provided.  Questions were addressed and the patient was encouraged to contact our department should concerns arise.  The patient was seen from  10:10-10:30 am.

## 2025-06-06 DIAGNOSIS — S32.030S COMPRESSION FRACTURE OF L3 VERTEBRA, SEQUELA: ICD-10-CM

## 2025-06-06 DIAGNOSIS — G89.21 CHRONIC PAIN AFTER TRAUMATIC INJURY: ICD-10-CM

## 2025-06-06 DIAGNOSIS — E55.9 VITAMIN D DEFICIENCY: ICD-10-CM

## 2025-06-06 DIAGNOSIS — G89.4 CHRONIC PAIN DISORDER: ICD-10-CM

## 2025-06-06 PROCEDURE — RXMED WILLOW AMBULATORY MEDICATION CHARGE

## 2025-06-08 ENCOUNTER — PHARMACY VISIT (OUTPATIENT)
Dept: PHARMACY | Facility: CLINIC | Age: 45
End: 2025-06-08
Payer: MEDICAID

## 2025-06-08 PROCEDURE — RXMED WILLOW AMBULATORY MEDICATION CHARGE

## 2025-06-09 DIAGNOSIS — E55.9 VITAMIN D DEFICIENCY: Primary | ICD-10-CM

## 2025-06-09 RX ORDER — ERGOCALCIFEROL 1.25 MG/1
1.25 CAPSULE ORAL
Qty: 12 CAPSULE | Refills: 2 | OUTPATIENT
Start: 2025-06-15 | End: 2026-06-15

## 2025-06-09 RX ORDER — ACETAMINOPHEN 500 MG
125 TABLET ORAL DAILY
Qty: 90 TABLET | Refills: 3 | Status: SHIPPED | OUTPATIENT
Start: 2025-06-09

## 2025-06-09 NOTE — PROGRESS NOTES
Pharmacist Clinic: Nephrology Management    Good Gee is a 45 y.o. male was referred to Clinical Pharmacy Team for ckd management.     Referring Provider: Luke Leyva MD    THIS IS A NEW PATIENT APPOINTMENT. PATIENT WILL BE ESTABLISHING CARE WITH CLINICAL PHARMACY.    Appointment was completed by good who was reached at [phone].    REVIEW OF PAST APPNT (IF APPLICABLE):   N/a    Allergies Reviewed? Yes    Allergies[1]    Medications Ordered Prior to Encounter[2]    PHARMACEUTICAL ASSESSMENT:    MEDICATION RECONCILIATION    Was a medication reconciliation completed at this visit? Yes  Home Pharmacy Reviewed? Yes, describe: [facility]      Drug Interactions? No    Medication Documentation Review Audit       Reviewed by OMAIRA Reeder (Nurse Practitioner) on 06/04/25 at 1146      Medication Order Taking? Sig Documenting Provider Last Dose Status   acetaminophen (Tylenol) 500 mg tablet [id number]  take 2 tablets BY by mouth every 6 hours if needed Historical Provider, MD  Active   amLODIPine (Norvasc) 10 mg tablet [id number]  Take 1 tablet (10 mg) by mouth once daily. Cosme Hightower MD  Active   atorvastatin (Lipitor) 40 mg tablet [id number]  Take 1 tablet (40 mg) by mouth once daily at bedtime. Cosme Hightower MD  Active   blood sugar diagnostic (Accu-Chek Guide test strips) strip [id number]  TEST 4 TIMES DAILY for CGM Guardian calibration Shelbie Tariq MD  Active   calcitriol (Rocaltrol) 0.25 mcg capsule [id number]  Take 1 capsule (0.25 mcg) by mouth every other day. Luke Leyva MD  Active   cetirizine (ZyrTEC) 10 mg tablet [id number]  Take 1 tablet (10 mg) by mouth once daily. TAKE 1 TABLET Daily as needed for  seasonal allergies Cosme Hightower MD  Active   chlorthalidone (Hygroton) 50 mg tablet [id number]  Take 1 tablet (50 mg) by mouth once daily. Cosme Hightower MD  Active   ergocalciferol (Vitamin D2) 1250 mcg (50,000 units) capsule [id number]  Take 1 capsule (1,250 mcg) by mouth 1 (one) time  per week. Chele Ruiz MD  Active   fluticasone (Flonase) 50 mcg/actuation nasal spray [id number]  Administer 1 spray into affected nostril(s) 2 times a day. Historical Provider, MD  Active   glucagon (Glucagen) 1 mg injection [id number]  Inject 1 mg under the skin 1 time if needed for low blood sugar - see comments (for severe hypoglycemia as needed). Shelbie Tariq MD  Active    Patient not taking:   Discontinued 05/30/25 1201   hydrALAZINE (Apresoline) 25 mg tablet [id number]  Take 1 tablet (25 mg) by mouth 3 times a day. cD Brumfield MD  Active   hydrOXYzine HCL (Atarax) 25 mg tablet [id number]  Take 1 tablet (25 mg) by mouth every 4 hours if needed. Historical Provider, MD  Active   insulin lispro 100 unit/mL injection [id number]  INJECT 60 UNITS UNDER THE SKIN ONCE DAILY VIA INSULIN PUMP. Chele Ruiz MD  Active   lancing device/lancets (ACCU-CHEK FASTCLIX LANCING DEV [facility] [place]) [id number]  Patient is to test up to 5 times daily as needed Historical Provider, MD  Active   lidocaine (Lidoderm) 5 % patch [id number]  Place 1 patch over 12 hours on the skin once daily. APPLY 1 PATCH TO THE AFFECTED AREA AND LEAVE IN PLACE FOR 12 HOURS, THEN REMOVE AND LEAVE OFF FOR 12 HOURS. Dc Brumfield MD  Active   lisinopril 40 mg tablet [id number]  Take 1 tablet (40 mg) by mouth once daily. TAKE 1 TABLET DAILY FOR BLOOD PRESSURE. please schedule follow up appointment Cosme Hightower MD  Active   naloxone (Narcan) 4 mg/0.1 mL nasal spray [id number]  Administer 1 spray (4 mg) into affected nostril(s) if needed for opioid reversal or respiratory depression. Cosme Hightower MD  Active     Discontinued 05/30/25 1201   nebivolol (Bystolic) 5 mg tablet [id number]  Take 1 tablet (5 mg) by mouth once daily. Luke Leyva MD  Active   oxyCODONE-acetaminophen (Percocet) 5-325 mg tablet [id number]  Take 1 tablet by mouth 2 times a day as needed for severe pain (7 - 10). Do not fill before May 8, 2025. Cosme Hightower MD  Active  "  oxyCODONE-acetaminophen (Percocet) 5-325 mg tablet [id number]  Take 1 tablet by mouth 2 times a day as needed for severe pain (7 - 10). Do not fill before June 7, 2025. Cosme Hightower MD  Active   oxyCODONE-acetaminophen (Percocet) 5-325 mg tablet [id number]  Take 1 tablet by mouth 2 times a day as needed for severe pain (7 - 10). Do not fill before July 7, 2025. Cosme Hightower MD  Active   pen needle, diabetic 32 gauge x 5/32\" needle [id number]   Historical Provider, MD  Active   polyethylene glycol (Glycolax) 17 gram/dose powder [id number] No Take by mouth. Historical Provider, MD Taking Active   sildenafil (Viagra) 25 mg tablet [id number] No TAKE 1 TABLET DAILY 1 HOUR BEFORE NEEDED Historical Provider, MD Taking Active                      Lab Review  Electrolytes:      Lab Results   Component Value Date    BILITOT 1.3 (H) 04/29/2025    CALCIUM 8.8 04/29/2025    CO2 24 04/29/2025     04/29/2025    CREATININE 2.37 (H) 04/29/2025    GLUCOSE 210 (H) 04/29/2025    ALKPHOS 55 04/29/2025    K 4.3 04/29/2025    PROT 7.3 04/29/2025     04/29/2025    AST 15 04/29/2025    ALT 9 04/29/2025    BUN 28 (H) 04/29/2025    ANIONGAP 10 05/01/2024    MG 2.01 10/05/2020    PHOS 2.7 04/29/2025    ALBUMIN 4.0 04/29/2025    AMYLASE 57 10/02/2020    LIPASE 28 10/02/2020    GFRMALE 28 (A) 06/24/2022     HISTORY OF PRESENT ILLNESS    CKD ASSESSMENT    Stage: 3b (eGFR 30-44)      Last GFR:   Lab Results   Component Value Date    EGFR 33 (L) 04/29/2025    EGFR 34 (L) 05/01/2024    EGFR 35 (L) 03/07/2024       Last Albumin/Creatine Ratio:   ALBUMIN/CREATININE RATIO, RANDOM URINE   Date Value Ref Range Status   04/29/2025 126 (H) <30 mg/g creat Final     Comment:        The ADA defines abnormalities in albumin  excretion as follows:     Albuminuria Category        Result (mg/g creatinine)     Normal to Mildly increased   <30  Moderately increased            Severely increased           > OR = 300     The ADA recommends that at least " two of three  specimens collected within a 3-6 month period be  abnormal before considering a patient to be  within a diagnostic category.       Albumin/Creatinine Ratio   Date Value Ref Range Status   05/01/2024 253.7 (H) <30.0 ug/mg Creat Final     Albumin/Creatine Ratio   Date Value Ref Range Status   01/18/2022 815.1 (H) 0.0 - 30.0 ug/mg crt Final         MEDICATIONS REQUIRING RENAL DOSE ADJUSTMENTS:    After review, all medications are dosed appropriately   The following medications increase risk of MADELINE and should be closely monitored:  ACEi    DISCUSSION/ASSESSMENT:   Discussed:   -katie heredia started    EDUCATION:   Counseled patient on MOA, expectations, side effects, duration of therapy, contraindications, administration, and monitoring parameters  Recommended maintaining a diet with a protein intake no more than 0.8 g/kg body weight per day and a sodium intake no more than 2 grams per day  For patients with HTN, recommended that we target a systolic blood pressure less than 120 mm Hg, when tolerated.  Answered all patient questions and concerns      Assessment/Plan   Problem List Items Addressed This Visit    None      RECOMMENDATIONS/PLAN    Discussed that unfortunately pa for kerendia denied by medicaid. Currently only covered for type 2 dm patients. Patient reports may be starting new insurance plan through new employer we can revisit if authorization criteria are different with this plan. Patient will call back once plan active.    Last Appnt with Referring Provider: 5/30/2025  Next Appnt with Referring Provider: 12/2/2025  Clinical Pharmacist follow up: tbd  Type of Encounter: Benito Morley PharmD    Verbal consent to manage patient's drug therapy was obtained from the patient . They were informed they may decline to participate or withdraw from participation in pharmacy services at any time.    Continue all meds under the continuation of care with the referring provider and clinical  pharmacy team.         [1]   Allergies  Allergen Reactions    Shellfish Derived Unknown   [2]   Current Outpatient Medications on File Prior to Visit   Medication Sig Dispense Refill    acetaminophen (Tylenol) 500 mg tablet take 2 tablets BY by mouth every 6 hours if needed      amLODIPine (Norvasc) 10 mg tablet Take 1 tablet (10 mg) by mouth once daily. 90 tablet 3    atorvastatin (Lipitor) 40 mg tablet Take 1 tablet (40 mg) by mouth once daily at bedtime. 90 tablet 3    blood sugar diagnostic (Accu-Chek Guide test strips) strip TEST 4 TIMES DAILY for CGM Guardian calibration 200 strip 3    calcitriol (Rocaltrol) 0.25 mcg capsule Take 1 capsule (0.25 mcg) by mouth every other day. 45 capsule 3    cetirizine (ZyrTEC) 10 mg tablet Take 1 tablet (10 mg) by mouth once daily. TAKE 1 TABLET Daily as needed for  seasonal allergies 90 tablet 3    chlorthalidone (Hygroton) 50 mg tablet Take 1 tablet (50 mg) by mouth once daily. 90 tablet 3    ergocalciferol (Vitamin D2) 1250 mcg (50,000 units) capsule Take 1 capsule (1,250 mcg) by mouth 1 (one) time per week. 12 capsule 2    fluticasone (Flonase) 50 mcg/actuation nasal spray Administer 1 spray into affected nostril(s) 2 times a day.      glucagon (Glucagen) 1 mg injection Inject 1 mg under the skin 1 time if needed for low blood sugar - see comments (for severe hypoglycemia as needed). 1 kit 11    hydrALAZINE (Apresoline) 25 mg tablet Take 1 tablet (25 mg) by mouth 3 times a day. 90 tablet 3    hydrOXYzine HCL (Atarax) 25 mg tablet Take 1 tablet (25 mg) by mouth every 4 hours if needed.      insulin lispro 100 unit/mL injection INJECT 60 UNITS UNDER THE SKIN ONCE DAILY VIA INSULIN PUMP. 60 mL 3    lancing device/lancets (ACCU-CHEK FASTCLIX LANCING [place] [facility] [place] [facility] [place] Patient is to test up to 5 times daily as needed      lidocaine (Lidoderm) 5 % patch Place 1 patch over 12 hours on the skin once daily. APPLY 1 PATCH TO THE AFFECTED AREA AND LEAVE IN PLACE FOR 12 HOURS, THEN REMOVE AND  "LEAVE OFF FOR 12 HOURS. 30 patch 2    lisinopril 40 mg tablet Take 1 tablet (40 mg) by mouth once daily. TAKE 1 TABLET DAILY FOR BLOOD PRESSURE. please schedule follow up appointment 90 tablet 3    naloxone (Narcan) 4 mg/0.1 mL nasal spray Administer 1 spray (4 mg) into affected nostril(s) if needed for opioid reversal or respiratory depression. 2 each 1    nebivolol (Bystolic) 5 mg tablet Take 1 tablet (5 mg) by mouth once daily. 90 tablet 3    [] oxyCODONE-acetaminophen (Percocet) 5-325 mg tablet Take 1 tablet by mouth 2 times a day as needed for severe pain (7 - 10). Do not fill before May 8, 2025. 50 tablet 0    oxyCODONE-acetaminophen (Percocet) 5-325 mg tablet Take 1 tablet by mouth 2 times a day as needed for severe pain (7 - 10). Do not fill before 2025. 50 tablet 0    [START ON 2025] oxyCODONE-acetaminophen (Percocet) 5-325 mg tablet Take 1 tablet by mouth 2 times a day as needed for severe pain (7 - 10). Do not fill before 2025. 50 tablet 0    pen needle, diabetic 32 gauge x \" needle       polyethylene glycol (Glycolax) 17 gram/dose powder Take by mouth.      sildenafil (Viagra) 25 mg tablet TAKE 1 TABLET DAILY 1 HOUR BEFORE NEEDED       No current facility-administered medications on file prior to visit.     "

## 2025-06-10 ENCOUNTER — APPOINTMENT (OUTPATIENT)
Dept: PHARMACY | Facility: HOSPITAL | Age: 45
End: 2025-06-10
Payer: COMMERCIAL

## 2025-06-10 DIAGNOSIS — N18.9 CHRONIC KIDNEY DISEASE, UNSPECIFIED CKD STAGE: ICD-10-CM

## 2025-06-10 DIAGNOSIS — I10 BENIGN ESSENTIAL HTN: ICD-10-CM

## 2025-06-10 DIAGNOSIS — R80.8 OTHER PROTEINURIA: ICD-10-CM

## 2025-06-10 DIAGNOSIS — E08.22 DIABETES MELLITUS DUE TO UNDERLYING CONDITION WITH DIABETIC CHRONIC KIDNEY DISEASE, UNSPECIFIED CKD STAGE, UNSPECIFIED WHETHER LONG TERM INSULIN USE: ICD-10-CM

## 2025-06-10 RX ORDER — OXYCODONE AND ACETAMINOPHEN 5; 325 MG/1; MG/1
1 TABLET ORAL 2 TIMES DAILY PRN
Qty: 50 TABLET | Refills: 0 | OUTPATIENT
Start: 2025-06-10 | End: 2025-07-10

## 2025-06-20 ENCOUNTER — OFFICE VISIT (OUTPATIENT)
Facility: HOSPITAL | Age: 45
End: 2025-06-20
Payer: COMMERCIAL

## 2025-06-20 VITALS
HEART RATE: 67 BPM | BODY MASS INDEX: 28.39 KG/M2 | WEIGHT: 202.8 LBS | TEMPERATURE: 97.9 F | SYSTOLIC BLOOD PRESSURE: 123 MMHG | OXYGEN SATURATION: 98 % | DIASTOLIC BLOOD PRESSURE: 72 MMHG | HEIGHT: 71 IN | RESPIRATION RATE: 18 BRPM

## 2025-06-20 DIAGNOSIS — I10 PRIMARY HYPERTENSION: ICD-10-CM

## 2025-06-20 DIAGNOSIS — G89.21 CHRONIC PAIN AFTER TRAUMATIC INJURY: ICD-10-CM

## 2025-06-20 DIAGNOSIS — G89.4 CHRONIC PAIN DISORDER: Primary | ICD-10-CM

## 2025-06-20 DIAGNOSIS — S32.009S CLOSED FRACTURE OF LUMBAR VERTEBRA, UNSPECIFIED FRACTURE MORPHOLOGY, UNSPECIFIED LUMBAR VERTEBRAL LEVEL, SEQUELA: ICD-10-CM

## 2025-06-20 PROCEDURE — RXMED WILLOW AMBULATORY MEDICATION CHARGE

## 2025-06-20 RX ORDER — LISINOPRIL 40 MG/1
40 TABLET ORAL DAILY
Qty: 90 TABLET | Refills: 3 | Status: SHIPPED | OUTPATIENT
Start: 2025-06-20

## 2025-06-20 RX ORDER — OXYCODONE AND ACETAMINOPHEN 5; 325 MG/1; MG/1
1 TABLET ORAL 2 TIMES DAILY PRN
Qty: 50 TABLET | Refills: 0 | Status: SHIPPED | OUTPATIENT
Start: 2025-09-05 | End: 2025-09-30

## 2025-06-20 RX ORDER — LIDOCAINE 50 MG/G
1 PATCH TOPICAL DAILY
Qty: 30 PATCH | Refills: 2 | Status: SHIPPED | OUTPATIENT
Start: 2025-06-20

## 2025-06-20 RX ORDER — OXYCODONE AND ACETAMINOPHEN 5; 325 MG/1; MG/1
1 TABLET ORAL 2 TIMES DAILY PRN
Qty: 50 TABLET | Refills: 0 | Status: SHIPPED | OUTPATIENT
Start: 2025-08-06 | End: 2025-08-31

## 2025-06-20 ASSESSMENT — ENCOUNTER SYMPTOMS: BACK PAIN: 1

## 2025-06-20 ASSESSMENT — PAIN SCALES - GENERAL: PAINLEVEL_OUTOF10: 0-NO PAIN

## 2025-06-20 NOTE — PROGRESS NOTES
Subjective   Patient ID:   Good Gee is a 45 y.o. male who presents for Follow-up and Med Refill.  HPI  #Chronic low back pain 2/2 to L3 compression fracture   - Does water aerobics while in [place] and gets massages when down in [place]  - Uses lidocaine patches and needs refills   OARRS:  No data recorded  I have personally reviewed the OARRS report for Good Gee. I have considered the risks of abuse, dependence, addiction and diversion    Is the patient prescribed a combination of a benzodiazepine and opioid?  No    Last Urine Drug Screen / ordered today: No  Recent Results (from the past 8760 hours)   Opiate/Opioid/Benzo Prescription Compliance    Collection Time: 04/18/25  2:41 PM   Result Value Ref Range    Creatinine 186.1 > or = 20.0 mg/dL    pH 6.5 4.5 - 9.0    Oxidant NEGATIVE <200 mcg/mL    Amphetamines NEGATIVE <500 ng/mL    Barbiturates NEGATIVE <300 ng/mL    Cocaine Metabolite NEGATIVE <150 ng/mL    Marijuana Metabolite NEGATIVE <20 ng/mL    Phencyclidine NEGATIVE <25 ng/mL    Alphahydroxyalprazolam NEGATIVE <25 ng/mL    Alphahydroxymidazolam NEGATIVE <50 ng/mL    Alphahydroxytriazolam NEGATIVE <50 ng/mL    Aminoclonazepam NEGATIVE <25 ng/mL    Hydroxyethylflurazepam NEGATIVE <50 ng/mL    Lorazepam NEGATIVE <50 ng/mL    Nordiazepam NEGATIVE <50 ng/mL    Oxazepam NEGATIVE <50 ng/mL    Temazepam NEGATIVE <50 ng/mL    Benzodiazepines Comments      Fentanyl NEGATIVE <0.5 ng/mL    Norfentanyl NEGATIVE <0.5 ng/mL    Fentanyl Comments      6 Acetylmorphine NEGATIVE <10 ng/mL    Heroin Metab Comments      EDDP NEGATIVE <100 ng/mL    Methadone NEGATIVE <100 ng/mL    Methadone Comments      Codeine NEGATIVE <50 ng/mL    Hydrocodone NEGATIVE <50 ng/mL    Hydromorphone NEGATIVE <50 ng/mL    Morphine NEGATIVE <50 ng/mL    Norhydrocodone NEGATIVE <50 ng/mL    Opiates Comments      Noroxycodone NEGATIVE <50 ng/mL    Oxycodone NEGATIVE <50 ng/mL    Oxymorphone NEGATIVE <50 ng/mL    Oxycodone Comments       Desmethyltramadol NEGATIVE <100 ng/mL    Tramadol NEGATIVE <100 ng/mL    Tramadol Comments      Zolpidem NEGATIVE <5 ng/mL    Zolpidem Metabolite NEGATIVE <5 ng/mL    Zolpidem Comments      Notes and Comments     Screen Opiate/Opioid/Benzo Prescription Compliance    Collection Time: 09/20/24  4:50 PM   Result Value Ref Range    Creatinine, Urine Random 293.8 20.0 - 370.0 mg/dL    Amphetamine Screen, Urine Presumptive Negative Presumptive Negative    Barbiturate Screen, Urine Presumptive Negative Presumptive Negative    Cannabinoid Screen, Urine Presumptive Negative Presumptive Negative    Cocaine Metabolite Screen, Urine Presumptive Negative Presumptive Negative    PCP Screen, Urine Presumptive Negative Presumptive Negative     Results are as expected.     Clinical rationale for not completing a Urine Drug Screen: UDS done at last appointment      Controlled Substance Agreement:  Date of the Last Agreement: 4/18/2025  Reviewed Controlled Substance Agreement including but not limited to the benefits, risks, and alternatives to treatment with a Controlled Substance medication(s).    Opioids:  What is the patient's goal of therapy? Make pain more tolerable and allow him to do his work and chores   Is this being achieved with current treatment? Yes    I have calculated the patient's Morphine Dose Equivalent (MED):   I have considered referral to Pain Management and/or a specialist, and do not feel it is necessary at this time.    I feel that it is clinically indicated to continue this current medication regimen after consideration of alternative therapies, and other non-opioid treatment.    Opioid Risk Screening:  No data recorded    Pain Assessment:  No data recorded    #HTN  - Requested refills on Lisinopril 40 mg daily    Review of Systems   Musculoskeletal:  Positive for back pain.       Objective   /72 (BP Location: Right arm, Patient Position: Sitting, BP Cuff Size: Adult)   Pulse 67   Temp 36.6 °C (97.9  "°F) (Temporal)   Resp 18   Ht 1.803 m (5' 11\")   Wt 92 kg (202 lb 12.8 oz)   SpO2 98%   BMI 28.28 kg/m²    Physical Exam  Constitutional:       General: He is not in acute distress.     Appearance: Normal appearance. He is not ill-appearing.   HENT:      Head: Normocephalic and atraumatic.   Eyes:      Extraocular Movements: Extraocular movements intact.      Conjunctiva/sclera: Conjunctivae normal.   Cardiovascular:      Rate and Rhythm: Normal rate and regular rhythm.      Heart sounds: Normal heart sounds. No murmur heard.     No friction rub. No gallop.   Pulmonary:      Effort: Pulmonary effort is normal. No respiratory distress.      Breath sounds: Normal breath sounds. No wheezing.   Musculoskeletal:         General: Normal range of motion.      Cervical back: Normal range of motion.   Neurological:      Mental Status: He is alert.   Psychiatric:         Mood and Affect: Mood normal.         Behavior: Behavior normal.         Assessment/Plan     Good Gee is a 45 y.o. male with PMH of T1DM, chronic low back pain, CKD who presents for Follow-up.     #Chronic low back pain 2/2 to L3 compression fracture   - OARRS reviewed and appropriate   - UDS and substance use agreement up to date  - Refilled Percocet 325-5 mg BID PRN  - Refilled Lidocaine patches  - Encouraged to continue with water aerobics and massages to aid with his pain    #HTN  - IO /72, at goal  - c/w current regimen  - Refilled Lisinopril 30 mg every day     Problem List Items Addressed This Visit       Fracture lumbar vertebra-closed (Multi)    Relevant Medications    oxyCODONE-acetaminophen (Percocet) 5-325 mg tablet (Start on 9/5/2025)    oxyCODONE-acetaminophen (Percocet) 5-325 mg tablet (Start on 8/6/2025)    lidocaine (Lidoderm) 5 % patch    Hypertension (Chronic)    Relevant Medications    lisinopril 40 mg tablet     Other Visit Diagnoses         Chronic pain after traumatic injury        Relevant Medications    " oxyCODONE-acetaminophen (Percocet) 5-325 mg tablet (Start on 9/5/2025)    oxyCODONE-acetaminophen (Percocet) 5-325 mg tablet (Start on 8/6/2025)    lidocaine (Lidoderm) 5 % patch      Chronic pain disorder        Relevant Medications    oxyCODONE-acetaminophen (Percocet) 5-325 mg tablet (Start on 9/5/2025)    oxyCODONE-acetaminophen (Percocet) 5-325 mg tablet (Start on 8/6/2025)    lidocaine (Lidoderm) 5 % patch           RTC in 3 months for a follow up.       The patient was seen and discussed with Dr. Taylor.    Cosme Hightower MD  Family Medicine   PGY-3

## 2025-06-22 ENCOUNTER — PHARMACY VISIT (OUTPATIENT)
Dept: PHARMACY | Facility: CLINIC | Age: 45
End: 2025-06-22
Payer: MEDICAID

## 2025-07-09 PROCEDURE — RXMED WILLOW AMBULATORY MEDICATION CHARGE

## 2025-07-10 ENCOUNTER — PHARMACY VISIT (OUTPATIENT)
Dept: PHARMACY | Facility: CLINIC | Age: 45
End: 2025-07-10
Payer: COMMERCIAL

## 2025-08-04 ENCOUNTER — APPOINTMENT (OUTPATIENT)
Dept: ENDOCRINOLOGY | Facility: CLINIC | Age: 45
End: 2025-08-04
Payer: COMMERCIAL

## 2025-08-08 ENCOUNTER — PHARMACY VISIT (OUTPATIENT)
Dept: PHARMACY | Facility: CLINIC | Age: 45
End: 2025-08-08
Payer: COMMERCIAL

## 2025-08-08 PROCEDURE — RXMED WILLOW AMBULATORY MEDICATION CHARGE

## 2025-12-02 ENCOUNTER — APPOINTMENT (OUTPATIENT)
Dept: NEPHROLOGY | Facility: CLINIC | Age: 45
End: 2025-12-02
Payer: COMMERCIAL

## 2026-06-04 ENCOUNTER — APPOINTMENT (OUTPATIENT)
Facility: CLINIC | Age: 46
End: 2026-06-04
Payer: COMMERCIAL